# Patient Record
Sex: MALE | Race: BLACK OR AFRICAN AMERICAN | Employment: OTHER | URBAN - METROPOLITAN AREA
[De-identification: names, ages, dates, MRNs, and addresses within clinical notes are randomized per-mention and may not be internally consistent; named-entity substitution may affect disease eponyms.]

---

## 2017-07-02 DIAGNOSIS — N52.01 ERECTILE DYSFUNCTION DUE TO ARTERIAL INSUFFICIENCY: ICD-10-CM

## 2017-07-03 RX ORDER — SILDENAFIL CITRATE 100 MG/1
TABLET, FILM COATED ORAL
Qty: 10 TAB | Refills: 0 | OUTPATIENT
Start: 2017-07-03

## 2017-07-21 ENCOUNTER — HOSPITAL ENCOUNTER (OUTPATIENT)
Dept: LAB | Age: 72
Discharge: HOME | End: 2017-07-21

## 2017-07-21 PROCEDURE — 99001 SPECIMEN HANDLING PT-LAB: CPT | Performed by: FAMILY MEDICINE

## 2017-07-21 NOTE — TELEPHONE ENCOUNTER
Patient requesting the following medication refill     Medication requesting Viagra    Date last prescribed 06/27/2016  QTY 10 Refills 12    Date patient last seen 8/23/16    Follow up appt scheduled for 08/02/17    Please advise

## 2017-07-22 RX ORDER — SILDENAFIL 100 MG/1
100 TABLET, FILM COATED ORAL AS NEEDED
Qty: 10 TAB | Refills: 12 | Status: SHIPPED | OUTPATIENT
Start: 2017-07-22 | End: 2017-08-02 | Stop reason: SDUPTHER

## 2017-08-02 ENCOUNTER — OFFICE VISIT (OUTPATIENT)
Dept: FAMILY MEDICINE CLINIC | Age: 72
End: 2017-08-02

## 2017-08-02 VITALS
DIASTOLIC BLOOD PRESSURE: 90 MMHG | BODY MASS INDEX: 32.53 KG/M2 | HEIGHT: 72 IN | OXYGEN SATURATION: 95 % | HEART RATE: 92 BPM | RESPIRATION RATE: 18 BRPM | WEIGHT: 240.2 LBS | TEMPERATURE: 97.7 F | SYSTOLIC BLOOD PRESSURE: 137 MMHG

## 2017-08-02 DIAGNOSIS — Z00.00 ROUTINE GENERAL MEDICAL EXAMINATION AT A HEALTH CARE FACILITY: Primary | ICD-10-CM

## 2017-08-02 DIAGNOSIS — N52.01 ERECTILE DYSFUNCTION DUE TO ARTERIAL INSUFFICIENCY: ICD-10-CM

## 2017-08-02 DIAGNOSIS — D47.Z2 CASTLEMAN DISEASE (HCC): ICD-10-CM

## 2017-08-02 RX ORDER — SILDENAFIL 100 MG/1
100 TABLET, FILM COATED ORAL AS NEEDED
Qty: 10 TAB | Refills: 12 | Status: SHIPPED | OUTPATIENT
Start: 2017-08-02 | End: 2017-09-26 | Stop reason: SDUPTHER

## 2017-08-02 NOTE — MR AVS SNAPSHOT
Visit Information Date & Time Provider Department Dept. Phone Encounter #  
 8/2/2017  8:00 AM Bev Rich, [address] [facility] [phone] [id number] Follow-up Instructions Return in about 1 year (around 8/2/2018) for physical, labs at next visit, EKG next visit. Upcoming Health Maintenance Date Due DTaP/Tdap/Td series (2 - Td) 2/20/2017 MEDICARE YEARLY EXAM 6/29/2017 INFLUENZA AGE 9 TO ADULT 8/1/2017 GLAUCOMA SCREENING Q2Y 2/2/2018 COLONOSCOPY 12/20/2026 Allergies as of 8/2/2017  Review Complete On: 8/2/2017 By: Bev Rich, DO No Known Allergies Current Immunizations  Reviewed on 10/5/2015 Name Date Influenza High Dose Vaccine PF 10/5/2015 Influenza Vaccine 11/20/2012 Pneumococcal Conjugate (PCV-13) 10/5/2015 Pneumococcal Vaccine (Unspecified Type) 11/20/2012 Tdap 2/20/2007 Zoster Vaccine, Live 11/20/2012 Not reviewed this visit You Were Diagnosed With   
  
 Codes Comments Routine general medical examination at a health care facility    -  Primary ICD-10-CM: Z00.00 ICD-9-CM: V70.0 [facility]     ICD-10-CM: D47. Z2 
ICD-9-CM: 785.6 Erectile dysfunction due to arterial insufficiency     ICD-10-CM: N52.01 
ICD-9-CM: 607.84 Vitals BP Pulse Temp Resp Height(growth percentile) Weight(growth percentile) 137/90 (BP 1 Location: Right arm, BP Patient Position: Sitting) 92 97.7 °F (36.5 °C) (Oral) 18 6' (1.829 m) 240 lb 3.2 oz (109 kg) SpO2 BMI Smoking Status 95% 32.58 kg/m2 Never Smoker BMI and BSA Data Body Mass Index Body Surface Area 32.58 kg/m 2 2.35 m 2 Preferred Pharmacy Pharmacy Name Phone [facility] [phone] [facility] [address], [address] [phone] Your Updated Medication List  
  
   
 This list is accurate as of: 17  9:12 AM.  Always use your most recent med list.  
  
  
  
  
 diph,pertuss(acel),tetanus vac(PF) 2 Lf-(2.5-5-3-5 mcg)-5Lf/0.5 mL Syrg vaccine Commonly known as:  ADACEL  
0.5 mL by IntraMUSCular route once for 1 dose. Indications: DIPHTHERIA-PERTUSSIS-TETANUS COMBINED PREVENTION  
  
 FISH OIL Cap Generic drug:  omega-3 fatty acids Take 1 Cap by mouth daily. multivitamin tablet Commonly known as:  ONE A DAY Take 1 Tab by mouth daily. sildenafil citrate 100 mg tablet Commonly known as:  VIAGRA Take 1 Tab by mouth as needed. Prescriptions Printed Refills diph,pertuss,acel,,tetanus vac,PF, (ADACEL) 2 Lf-(2.5-5-3-5 mcg)-5Lf/0.5 mL syrg vaccine 0 Si.5 mL by IntraMUSCular route once for 1 dose. Indications: DIPHTHERIA-PERTUSSIS-TETANUS COMBINED PREVENTION Class: Print Route: IntraMUSCular Prescriptions Sent to Pharmacy Refills  
 sildenafil citrate (VIAGRA) 100 mg tablet 12 Sig: Take 1 Tab by mouth as needed. Class: Normal  
 Pharmacy: [facility] [address] [place] [address] Rd, [address] #: [phone] Route: Oral  
  
We Performed the Following AMB POC EKG ROUTINE  LEADS, INTER & REP [43519 CPT(R)] Follow-up Instructions Return in about 1 year (around 2018) for physical, labs at next visit, EKG next visit. Introducing [facility]! Dear Jhon Riggs: Thank you for requesting a [employer] account. Our records indicate that you already have an active [employer] account. You can access your account anytime at [email or web address] Did you know that you can access your hospital and ER discharge instructions at any time in [employer]? You can also review all of your test results from your hospital stay or ER visit. Additional Information If you have questions, please visit the Frequently Asked Questions section of the [email or web address] website at [email or web address] Remember, [employer] is NOT to be used for urgent needs. For medical emergencies, dial 911. Now available from your iPhone and Android! Please provide this summary of care documentation to your next provider. Your primary care clinician is listed as [id number] [place] [address]. If you have any questions after today's visit, please call [phone].

## 2017-08-02 NOTE — PROGRESS NOTES
Shahid Calixto is a 71 y.o. male presents today for his medicare annual exam.  He would also like to discuss removing ear wax. Pt is in Room # 6        Learning Assessment (baseline): Completed  Depression Screening: Completed  Fall Risk Screening: Completed  Abuse screening: Completed  ADL Assessment: Completed    1. Have you been to the ER, urgent care clinic since your last visit? Hospitalized since your last visit? No    2. Have you seen or consulted any other health care providers outside of the [address] [place] [address] since your last visit? Include any pap smears or colon screening.  No

## 2017-08-02 NOTE — PROGRESS NOTES
THE SUBSEQUENT MEDICARE ANNUAL WELLNESS VISIT PROGRESS NOTES    This is a Subsequent Medicare Annual Wellness Visit providing Personalized Prevention Plan Services (PPPS) (Performed 12 months after initial AWV and PPPS )    I have reviewed the patient's medical history in detail and updated the computerized patient record. Pat Parish is a 71 y.o.  male and presents for an subsequent annual wellness exam     Patient Active Problem List    Diagnosis Date Noted    Advance directive discussed with patient 06/28/2016    [facility] 01/26/2016    Erectile dysfunction 02/20/2013     Current Outpatient Prescriptions   Medication Sig Dispense Refill    diph,pertuss,acel,,tetanus vac,PF, (ADACEL) 2 Lf-(2.5-5-3-5 mcg)-5Lf/0.5 mL syrg vaccine 0.5 mL by IntraMUSCular route once for 1 dose. Indications: DIPHTHERIA-PERTUSSIS-TETANUS COMBINED PREVENTION 0.5 mL 0    sildenafil citrate (VIAGRA) 100 mg tablet Take 1 Tab by mouth as needed. 10 Tab 12    multivitamin (ONE A DAY) tablet Take 1 Tab by mouth daily.  omega-3 fatty acids (FISH OIL) cap Take 1 Cap by mouth daily. 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 Bird Wade     Family History   Problem Relation Age of Onset    Heart Disease Mother      Social History   Substance Use Topics    Smoking status: Never Smoker    Smokeless tobacco: Never Used    Alcohol use No         ROS       All other systems reviewed and are negative.       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 Sarah Alejo     Current Outpatient Prescriptions   Medication Sig Dispense Refill    diph,pertuss,acel,,tetanus vac,PF, (ADACEL) 2 Lf-(2.5-5-3-5 mcg)-5Lf/0.5 mL syrg vaccine 0.5 mL by IntraMUSCular route once for 1 dose. Indications: DIPHTHERIA-PERTUSSIS-TETANUS COMBINED PREVENTION 0.5 mL 0    sildenafil citrate (VIAGRA) 100 mg tablet Take 1 Tab by mouth as needed. 10 Tab 12    multivitamin (ONE A DAY) tablet Take 1 Tab by mouth daily.  omega-3 fatty acids (FISH OIL) cap Take 1 Cap by mouth daily. No Known Allergies  Family History   Problem Relation Age of Onset    Heart Disease Mother      Social History   Substance Use Topics    Smoking status: Never Smoker    Smokeless tobacco: Never Used    Alcohol use No     Patient Active Problem List   Diagnosis Code    Erectile dysfunction N52.9    [facility]man [facility] ([facility] D47. Z2    Advance directive discussed with patient Z70.80       Health Maintenance History  Immunizations reviewed, dtap ordered  , pneumovax utd  , flu , zoster utd   Colonoscopy: utd , 2012  Chest CT :na,  Eye exam: utd     Health Care Directive or Living Will: yes    Depression Risk Factor Screening:      Patient Health Questionnaire (PHQ-2)   Over the last 2 weeks, how often have you been bothered by any of the following problems? · Little interest or pleasure in doing things? · Not at all. [0]  · Feeling down, depressed, or hopeless? · Not at all. [0]    Total Score: 0/6  PHQ-2 Assessment Scoring:   A score of 2 or more requires further screening with the PHQ-9    Alcohol Risk Factor Screening:     Women: On any occasion during the past 3 months, have you had more than 3 drinks containing alcohol? Do you average more than 7 drinks per week?   Men: On any occasion during the past 3 months, have you had more than 4 drinks containing alcohol? Do you average more than 14 drinks per week? Functional Ability and Level of Safety:     Hearing Loss    mild    Activities of Daily Living   Self-care. Requires assistance with: no ADLs    Fall Risk   No fall risk factors    Abuse Screen   None      Examination   Physical Examination  Vitals:    08/02/17 0836   BP: 137/90   Pulse: 92   Resp: 18   Temp: 97.7 °F (36.5 °C)   TempSrc: Oral   SpO2: 95%   Weight: 240 lb 3.2 oz (109 kg)   Height: 6' (1.829 m)   PainSc:   0 - No pain     Body mass index is 32.58 kg/(m^2). Evaluation of Cognitive Function:  Mood/affect:ajppropriate   Appearance: well groomed   Family member/caregiver input:n    alert, well appearing, and in no distress, oriented to person, place, and time and normal appearing weight    Patient Care Team:  Camilla Lundborg., DO as PCP - General (Family Practice)  Jannifer Heimlich, MD (Urology)    Advice/Referrals/Counseling/Plan:   Education and counseling provided:  Are appropriate based on today's review and evaluation  End-of-Life planning (with patient's consent)  Include in education list (weight loss, physical activity, smoking cessation, fall prevention, and nutrition)  current treatment plan is effective, no change in therapy. I have discussed the diagnosis with the patient and the intended plan as seen in the above orders. The patient has received an after-visit summary and questions were answered concerning future plans. I have discussed medication side effects and warnings with the patient as well. I have reviewed the plan of care with the patient, accepted their input and they are in agreement with the treatment goals.          Follow-up Disposition:  Return in about 1 year (around 8/2/2018) for physical, labs at next visit, EKG next visit.    _____________________________________________________________    Problem Assessment    for treatment of   Chief Complaint   Patient presents with   [address] [place] [address] Other castlemans dz         SUBJECTIVE          Additional Concerns:        Visit Vitals    /90 (BP 1 Location: Right arm, BP Patient Position: Sitting)    Pulse 92    Temp 97.7 °F (36.5 °C) (Oral)    Resp 18    Ht 6' (1.829 m)    Wt 240 lb 3.2 oz (109 kg)    SpO2 95%    BMI 32.58 kg/m2     General:  Alert, cooperative, no distress, appears stated age. Head:  Normocephalic, without obvious abnormality, atraumatic. Eyes:  Conjunctivae/corneas clear. PERRL, EOMs intact. Fundi benign   Ears:  Normal TMs and external ear canals both ears. Nose: Nares normal. Septum midline. Mucosa normal. No drainage or sinus tenderness. Throat: Lips, mucosa, and tongue normal. Teeth and gums normal.   Neck: Supple, symmetrical, trachea midline, no adenopathy, thyroid: no enlargement/tenderness/nodules, no carotid bruit and no JVD. Back:   Symmetric, no curvature. ROM normal. No CVA tenderness. Lungs:   Clear to auscultation bilaterally. Chest wall:  No tenderness or deformity. Heart:  Regular rate and rhythm, S1, S2 normal, no murmur, click, rub or gallop. Abdomen:   Soft, non-tender. Bowel sounds normal. No masses,  No organomegaly. Genitalia:  Normal male without lesion, discharge or tenderness. Rectal:  Normal tone, normal prostate, no masses or tenderness  Guaiac negative stool. Extremities: Extremities normal, atraumatic, no cyanosis or edema. Pulses: 2+ and symmetric all extremities. Skin: Skin color, texture, turgor normal. No rashes or lesions   Lymph nodes: Cervical, supraclavicular, and axillary nodes normal.   Neurologic: CNII-XII intact. Normal strength, sensation and reflexes throughout.              LABS   Component      Latest Ref Rng & Units 7/21/2017 7/21/2017 7/21/2017 7/21/2017           8:51 AM  8:51 AM  8:51 AM  8:51 AM   WBC      3.4 - 10.8 x10E3/uL       RBC      4.14 - 5.80 x10E6/uL       HGB      12.6 - 17.7 g/dL       HCT      37.5 - 51.0 %       MCV      79 - 97 fL MCH      26.6 - 33.0 pg       MCHC      31.5 - 35.7 g/dL       RDW      12.3 - 15.4 %       PLATELET      465 - 826 x10E3/uL       NEUTROPHILS      %       Lymphocytes      %       MONOCYTES      %       EOSINOPHILS      %       BASOPHILS      %       ABS. NEUTROPHILS      1.4 - 7.0 x10E3/uL       Abs Lymphocytes      0.7 - 3.1 x10E3/uL       ABS. MONOCYTES      0.1 - 0.9 x10E3/uL       ABS. EOSINOPHILS      0.0 - 0.4 x10E3/uL       ABS. BASOPHILS      0.0 - 0.2 x10E3/uL       IMMATURE GRANULOCYTES      %       ABS. IMM. GRANS.      0.0 - 0.1 x10E3/uL       Glucose      65 - 99 mg/dL       BUN      8 - 27 mg/dL       Creatinine      0.76 - 1.27 mg/dL       GFR est non-AA      >59 mL/min/1.73       GFR est AA      >59 mL/min/1.73       BUN/Creatinine ratio      10 - 24       Sodium      134 - 144 mmol/L       Potassium      3.5 - 5.2 mmol/L       Chloride      96 - 106 mmol/L       CO2      18 - 29 mmol/L       Calcium      8.6 - 10.2 mg/dL       Protein, total      6.0 - 8.5 g/dL       Albumin      3.5 - 4.8 g/dL       GLOBULIN, TOTAL      1.5 - 4.5 g/dL       A-G Ratio      1.2 - 2.2       Bilirubin, total      0.0 - 1.2 mg/dL       Alk.  phosphatase      39 - 117 IU/L       AST      0 - 40 IU/L       ALT (SGPT)      0 - 44 IU/L       Specific Gravity      1.005 - 1.030    1.021   pH (UA)      5.0 - 7.5    6.0   Color      Yellow    Yellow   Appearance      Clear    Clear   Leukocyte Esterase      Negative    Negative   Protein      Negative/Trace    Negative   Glucose      Negative    Negative   Ketone      Negative    Negative   Blood      Negative    Negative   Bilirubin      Negative    Negative   Urobilinogen      0.2 - 1.0 mg/dL    0.2   Nitrites      Negative    Negative   Microscopic Examination          Comment   Cholesterol, total      100 - 199 mg/dL   198    Triglyceride      0 - 149 mg/dL   83    HDL Cholesterol      >39 mg/dL   52    VLDL, calculated      5 - 40 mg/dL   17    LDL, calculated      0 - 99 mg/dL   129 (H)    Prostate Specific Ag      0.0 - 4.0 ng/mL  6.0 (H)     TSH      0.450 - 4.500 uIU/mL 4.460        Component      Latest Ref Rng & Units 7/21/2017 7/21/2017           8:51 AM  8:51 AM   WBC      3.4 - 10.8 x10E3/uL  6.1   RBC      4.14 - 5.80 x10E6/uL  4.47   HGB      12.6 - 17.7 g/dL  12.8   HCT      37.5 - 51.0 %  40.4   MCV      79 - 97 fL  90   MCH      26.6 - 33.0 pg  28.6   MCHC      31.5 - 35.7 g/dL  31.7   RDW      12.3 - 15.4 %  13.4   PLATELET      886 - 616 x10E3/uL  290   NEUTROPHILS      %  51   Lymphocytes      %  39   MONOCYTES      %  8   EOSINOPHILS      %  2   BASOPHILS      %  0   ABS. NEUTROPHILS      1.4 - 7.0 x10E3/uL  3.1   Abs Lymphocytes      0.7 - 3.1 x10E3/uL  2.3   ABS. MONOCYTES      0.1 - 0.9 x10E3/uL  0.5   ABS. EOSINOPHILS      0.0 - 0.4 x10E3/uL  0.1   ABS. BASOPHILS      0.0 - 0.2 x10E3/uL  0.0   IMMATURE GRANULOCYTES      %  0   ABS. IMM. GRANS.      0.0 - 0.1 x10E3/uL  0.0   Glucose      65 - 99 mg/dL 102 (H)    BUN      8 - 27 mg/dL 16    Creatinine      0.76 - 1.27 mg/dL 1.04    GFR est non-AA      >59 mL/min/1.73 72    GFR est AA      >59 mL/min/1.73 83    BUN/Creatinine ratio      10 - 24 15    Sodium      134 - 144 mmol/L 141    Potassium      3.5 - 5.2 mmol/L 4.4    Chloride      96 - 106 mmol/L 100    CO2      18 - 29 mmol/L 25    Calcium      8.6 - 10.2 mg/dL 9.3    Protein, total      6.0 - 8.5 g/dL 7.8    Albumin      3.5 - 4.8 g/dL 4.3    GLOBULIN, TOTAL      1.5 - 4.5 g/dL 3.5    A-G Ratio      1.2 - 2.2 1.2    Bilirubin, total      0.0 - 1.2 mg/dL 0.4    Alk.  phosphatase      39 - 117 IU/L 52    AST      0 - 40 IU/L 13    ALT (SGPT)      0 - 44 IU/L 12    Specific Gravity      1.005 - 1.030     pH (UA)      5.0 - 7.5     Color      Yellow     Appearance      Clear     Leukocyte Esterase      Negative     Protein      Negative/Trace     Glucose      Negative     Ketone      Negative     Blood      Negative     Bilirubin      Negative Urobilinogen      0.2 - 1.0 mg/dL     Nitrites      Negative     Microscopic Examination           Cholesterol, total      100 - 199 mg/dL     Triglyceride      0 - 149 mg/dL     HDL Cholesterol      >39 mg/dL     VLDL, calculated      5 - 40 mg/dL     LDL, calculated      0 - 99 mg/dL     Prostate Specific Ag      0.0 - 4.0 ng/mL     TSH      0.450 - 4.500 uIU/mL       TESTS    ekg  nsr      Assessment/Plan:        Diagnoses and all orders for this visit:    1. Routine general medical examination at a health care facility  -     AMB POC EKG ROUTINE W/ 12 LEADS, INTER & REP  -     diph,pertuss,acel,,tetanus vac,PF, (ADACEL) 2 Lf-(2.5-5-3-5 mcg)-5Lf/0.5 mL syrg vaccine; 0.5 mL by IntraMUSCular route once for 1 dose. Indications: DIPHTHERIA-PERTUSSIS-TETANUS COMBINED PREVENTION    2. 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 Key Psychotherapeutic Meds     Patient is on no antidepressant meds. Lab Results   Component Value Date/Time    Sodium 141 07/21/2017 08:51 AM    Creatinine 1.04 07/21/2017 08:51 AM    TSH 4.460 07/21/2017 08:51 AM    WBC 6.1 07/21/2017 08:51 AM    ALT (SGPT) 12 07/21/2017 08:51 AM    AST (SGOT) 13 07/21/2017 08:51 AM       Orders:  -     AMB POC EKG ROUTINE W/ 12 LEADS, INTER & REP  -     diph,pertuss,acel,,tetanus vac,PF, (ADACEL) 2 Lf-(2.5-5-3-5 mcg)-5Lf/0.5 mL syrg vaccine; 0.5 mL by IntraMUSCular route once for 1 dose. Indications: DIPHTHERIA-PERTUSSIS-TETANUS COMBINED PREVENTION    3. Erectile dysfunction due to arterial insufficiency  -     AMB POC EKG ROUTINE W/ 12 LEADS, INTER & REP  -     diph,pertuss,acel,,tetanus vac,PF, (ADACEL) 2 Lf-(2.5-5-3-5 mcg)-5Lf/0.5 mL syrg vaccine; 0.5 mL by IntraMUSCular route once for 1 dose.  Indications: DIPHTHERIA-PERTUSSIS-TETANUS COMBINED PREVENTION  -     sildenafil citrate (VIAGRA) 100 mg tablet; Take 1 Tab by mouth as needed.         Lab review: labs are reviewed, up to date and normal

## 2017-08-02 NOTE — ASSESSMENT & PLAN NOTE
Stable, based on history, physical exam and review of pertinent labs, studies and medications; meds reconciled; continue current treatment plan. Key Psychotherapeutic Meds     Patient is on no antidepressant meds.         Lab Results   Component Value Date/Time    Sodium 141 07/21/2017 08:51 AM    Creatinine 1.04 07/21/2017 08:51 AM    TSH 4.460 07/21/2017 08:51 AM    WBC 6.1 07/21/2017 08:51 AM    ALT (SGPT) 12 07/21/2017 08:51 AM    AST (SGOT) 13 07/21/2017 08:51 AM

## 2017-08-23 ENCOUNTER — HOSPITAL ENCOUNTER (EMERGENCY)
Age: 72
Discharge: HOME | End: 2017-08-23
Attending: EMERGENCY MEDICINE
Payer: MEDICARE

## 2017-08-23 ENCOUNTER — APPOINTMENT (OUTPATIENT)
Dept: GENERAL RADIOLOGY | Age: 72
End: 2017-08-23
Attending: EMERGENCY MEDICINE
Payer: MEDICARE

## 2017-08-23 VITALS
TEMPERATURE: 98.4 F | BODY MASS INDEX: 31.19 KG/M2 | HEART RATE: 90 BPM | WEIGHT: 230 LBS | OXYGEN SATURATION: 98 % | DIASTOLIC BLOOD PRESSURE: 92 MMHG | RESPIRATION RATE: 17 BRPM | SYSTOLIC BLOOD PRESSURE: 168 MMHG

## 2017-08-23 DIAGNOSIS — R03.0 ELEVATED BLOOD PRESSURE READING: ICD-10-CM

## 2017-08-23 DIAGNOSIS — M54.50 ACUTE RIGHT-SIDED LOW BACK PAIN WITHOUT SCIATICA: ICD-10-CM
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
## 2017-08-23 DIAGNOSIS — M25.522 LEFT ELBOW PAIN: ICD-10-CM

## 2017-08-23 DIAGNOSIS — S16.1XXA CERVICAL STRAIN, INITIAL ENCOUNTER: ICD-10-CM

## 2017-08-23 PROCEDURE — 99282 EMERGENCY DEPT VISIT SF MDM: CPT

## 2017-08-23 RX ORDER — METAXALONE 800 MG/1
800 TABLET ORAL 4 TIMES DAILY
Qty: 20 TAB | Refills: 0 | Status: SHIPPED | OUTPATIENT
Start: 2017-08-23 | End: 2017-08-28

## 2017-08-23 RX ORDER — HYDROCODONE BITARTRATE AND ACETAMINOPHEN 5; 325 MG/1; MG/1
TABLET ORAL
Qty: 12 TAB | Refills: 0 | Status: SHIPPED | OUTPATIENT
Start: 2017-08-23 | End: 2017-09-05
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
## 2017-08-23 NOTE — ED NOTES
I have reviewed discharge instructions with the patient. The patient verbalized understanding. Pt in nad ambulatory to ED lobby, agreeable to dc plan.

## 2017-09-05 ENCOUNTER — OFFICE VISIT (OUTPATIENT)
Dept: FAMILY MEDICINE CLINIC | Age: 72
End: 2017-09-05

## 2017-09-05 VITALS
RESPIRATION RATE: 18 BRPM | WEIGHT: 238.2 LBS | HEART RATE: 90 BPM | OXYGEN SATURATION: 96 % | SYSTOLIC BLOOD PRESSURE: 139 MMHG | BODY MASS INDEX: 32.26 KG/M2 | DIASTOLIC BLOOD PRESSURE: 79 MMHG | HEIGHT: 72 IN | TEMPERATURE: 96.8 F

## 2017-09-05 DIAGNOSIS — D47.Z2 CASTLEMAN DISEASE (HCC): Primary | ICD-10-CM

## 2017-09-05 DIAGNOSIS — Z23 ENCOUNTER FOR IMMUNIZATION: ICD-10-CM
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
## 2017-09-18 DIAGNOSIS — N52.01 ERECTILE DYSFUNCTION DUE TO ARTERIAL INSUFFICIENCY: ICD-10-CM

## 2017-09-18 RX ORDER — SILDENAFIL 100 MG/1
100 TABLET, FILM COATED ORAL AS NEEDED
Qty: 10 TAB | Refills: 12 | OUTPATIENT
Start: 2017-09-18

## 2017-09-21 ENCOUNTER — PATIENT MESSAGE (OUTPATIENT)
Dept: FAMILY MEDICINE CLINIC | Age: 72
End: 2017-09-21

## 2017-09-26 DIAGNOSIS — N52.01 ERECTILE DYSFUNCTION DUE TO ARTERIAL INSUFFICIENCY: ICD-10-CM

## 2017-09-26 RX ORDER — SILDENAFIL 100 MG/1
100 TABLET, FILM COATED ORAL AS NEEDED
Qty: 10 TAB | Refills: 12 | Status: SHIPPED | OUTPATIENT
Start: 2017-09-26 | End: 2018-08-13 | Stop reason: SDUPTHER

## 2017-09-26 NOTE — TELEPHONE ENCOUNTER
----- Message from [facility] sent at 9/22/2017  8:12 AM EDT -----  Regarding: FW: Prescription Question  Contact: [phone]   Please see below and advise.  ----- Message -----     From: Trishane Elida     Sent: 9/21/2017   9:22 PM       To: [facility] Nurse Pool  Subject: Prescription Question                            Dr. Bernardino Link, I have found a pharmacy that sells Phisher Viagra for $10.00 a pill(100 mg) instead of the $70.00 a pill that [facility] charges. I need a prescription written by you and signed, they will not accept a copy from [facility]. Could you send me a copy on my e mail, I don't have a fax ? E mail [email or web address] .     Thank [address] [phone]

## 2018-08-06 ENCOUNTER — TELEPHONE (OUTPATIENT)
Dept: FAMILY MEDICINE CLINIC | Age: 73
End: 2018-08-06

## 2018-08-06 ENCOUNTER — HOSPITAL ENCOUNTER (OUTPATIENT)
Dept: LAB | Age: 73
Discharge: HOME | End: 2018-08-06
Payer: MEDICARE

## 2018-08-06 DIAGNOSIS — D47.Z2 CASTLEMAN DISEASE (HCC): Primary | ICD-10-CM

## 2018-08-06 DIAGNOSIS — D47.Z2 CASTLEMAN DISEASE (HCC): ICD-10-CM

## 2018-08-06 DIAGNOSIS — N52.9 ERECTILE DYSFUNCTION, UNSPECIFIED ERECTILE DYSFUNCTION TYPE: ICD-10-CM

## 2018-08-06 LAB
ALBUMIN SERPL-MCNC: 3.7 G/DL (ref 3.4–5)
ALBUMIN/GLOB SERPL: 0.9 {RATIO} (ref 0.8–1.7)
ALP SERPL-CCNC: 53 U/L (ref 45–117)
ALT SERPL-CCNC: 20 U/L (ref 16–61)
ANION GAP SERPL CALC-SCNC: 6 MMOL/L (ref 3–18)
APPEARANCE UR: CLEAR
AST SERPL-CCNC: 17 U/L (ref 15–37)
BACTERIA URNS QL MICRO: NEGATIVE /HPF
BASOPHILS # BLD: 0 K/UL (ref 0–0.1)
BASOPHILS NFR BLD: 1 % (ref 0–2)
BILIRUB SERPL-MCNC: 0.6 MG/DL (ref 0.2–1)
BILIRUB UR QL: NEGATIVE
BUN SERPL-MCNC: 18 MG/DL (ref 7–18)
BUN/CREAT SERPL: 16 (ref 12–20)
CALCIUM SERPL-MCNC: 8.5 MG/DL (ref 8.5–10.1)
CHLORIDE SERPL-SCNC: 111 MMOL/L (ref 100–108)
CHOLEST SERPL-MCNC: 189 MG/DL
CO2 SERPL-SCNC: 26 MMOL/L (ref 21–32)
COLOR UR: YELLOW
CREAT SERPL-MCNC: 1.14 MG/DL (ref 0.6–1.3)
DIFFERENTIAL METHOD BLD: ABNORMAL
EOSINOPHIL # BLD: 0.1 K/UL (ref 0–0.4)
EOSINOPHIL NFR BLD: 2 % (ref 0–5)
EPITH CASTS URNS QL MICRO: NORMAL /LPF (ref 0–5)
ERYTHROCYTE [DISTWIDTH] IN BLOOD BY AUTOMATED COUNT: 12.8 % (ref 11.6–14.5)
GLOBULIN SER CALC-MCNC: 4.1 G/DL (ref 2–4)
GLUCOSE SERPL-MCNC: 96 MG/DL (ref 74–99)
GLUCOSE UR STRIP.AUTO-MCNC: NEGATIVE MG/DL
HCT VFR BLD AUTO: 37.8 % (ref 36–48)
HDLC SERPL-MCNC: 54 MG/DL (ref 40–60)
HDLC SERPL: 3.5 {RATIO} (ref 0–5)
HGB BLD-MCNC: 12.5 G/DL (ref 13–16)
HGB UR QL STRIP: NEGATIVE
KETONES UR QL STRIP.AUTO: NEGATIVE MG/DL
LDLC SERPL CALC-MCNC: 123.6 MG/DL (ref 0–100)
LEUKOCYTE ESTERASE UR QL STRIP.AUTO: NEGATIVE
LIPID PROFILE,FLP: ABNORMAL
LYMPHOCYTES # BLD: 1.9 K/UL (ref 0.9–3.6)
LYMPHOCYTES NFR BLD: 36 % (ref 21–52)
MCH RBC QN AUTO: 29.3 PG (ref 24–34)
MCHC RBC AUTO-ENTMCNC: 33.1 G/DL (ref 31–37)
MCV RBC AUTO: 88.7 FL (ref 74–97)
MONOCYTES # BLD: 0.5 K/UL (ref 0.05–1.2)
MONOCYTES NFR BLD: 9 % (ref 3–10)
NEUTS SEG # BLD: 2.8 K/UL (ref 1.8–8)
NEUTS SEG NFR BLD: 52 % (ref 40–73)
NITRITE UR QL STRIP.AUTO: NEGATIVE
PH UR STRIP: 5 [PH] (ref 5–8)
PLATELET # BLD AUTO: 277 K/UL (ref 135–420)
PMV BLD AUTO: 9.6 FL (ref 9.2–11.8)
POTASSIUM SERPL-SCNC: 4.1 MMOL/L (ref 3.5–5.5)
PROT SERPL-MCNC: 7.8 G/DL (ref 6.4–8.2)
PROT UR STRIP-MCNC: ABNORMAL MG/DL
PSA SERPL-MCNC: 6.9 NG/ML (ref 0–4)
RBC # BLD AUTO: 4.26 M/UL (ref 4.7–5.5)
RBC #/AREA URNS HPF: 0 /HPF (ref 0–5)
SODIUM SERPL-SCNC: 143 MMOL/L (ref 136–145)
SP GR UR REFRACTOMETRY: >1.03 (ref 1–1.03)
TRIGL SERPL-MCNC: 57 MG/DL (ref ?–150)
TSH SERPL DL<=0.05 MIU/L-ACNC: 2.6 UIU/ML (ref 0.36–3.74)
UROBILINOGEN UR QL STRIP.AUTO: 0.2 EU/DL (ref 0.2–1)
VLDLC SERPL CALC-MCNC: 11.4 MG/DL
WBC # BLD AUTO: 5.3 K/UL (ref 4.6–13.2)
WBC URNS QL MICRO: NORMAL /HPF (ref 0–4)

## 2018-08-06 PROCEDURE — 81001 URINALYSIS AUTO W/SCOPE: CPT | Performed by: FAMILY MEDICINE

## 2018-08-06 PROCEDURE — 84443 ASSAY THYROID STIM HORMONE: CPT | Performed by: FAMILY MEDICINE

## 2018-08-06 PROCEDURE — 36415 COLL VENOUS BLD VENIPUNCTURE: CPT | Performed by: FAMILY MEDICINE

## 2018-08-06 PROCEDURE — 80061 LIPID PANEL: CPT | Performed by: FAMILY MEDICINE

## 2018-08-06 PROCEDURE — 84153 ASSAY OF PSA TOTAL: CPT | Performed by: FAMILY MEDICINE

## 2018-08-06 PROCEDURE — 80053 COMPREHEN METABOLIC PANEL: CPT | Performed by: FAMILY MEDICINE

## 2018-08-06 PROCEDURE — 85025 COMPLETE CBC W/AUTO DIFF WBC: CPT | Performed by: FAMILY MEDICINE

## 2018-08-06 NOTE — TELEPHONE ENCOUNTER
Patient called from [facility] to ask for physical labs as he is scheduled for his MWE next week. Labs ordered.

## 2018-08-13 ENCOUNTER — OFFICE VISIT (OUTPATIENT)
Dept: FAMILY MEDICINE CLINIC | Age: 73
End: 2018-08-13

## 2018-08-13 VITALS
OXYGEN SATURATION: 95 % | WEIGHT: 243.6 LBS | TEMPERATURE: 97.8 F | HEIGHT: 72 IN | RESPIRATION RATE: 16 BRPM | DIASTOLIC BLOOD PRESSURE: 79 MMHG | BODY MASS INDEX: 33 KG/M2 | SYSTOLIC BLOOD PRESSURE: 132 MMHG | HEART RATE: 107 BPM

## 2018-08-13 DIAGNOSIS — D47.Z2 CASTLEMAN DISEASE (HCC): ICD-10-CM

## 2018-08-13 DIAGNOSIS — Z00.00 ROUTINE GENERAL MEDICAL EXAMINATION AT A HEALTH CARE FACILITY: Primary | ICD-10-CM

## 2018-08-13 DIAGNOSIS — N52.01 ERECTILE DYSFUNCTION DUE TO ARTERIAL INSUFFICIENCY: ICD-10-CM

## 2018-08-13 DIAGNOSIS — E78.00 ELEVATED CHOLESTEROL: ICD-10-CM

## 2018-08-13 RX ORDER — SILDENAFIL 100 MG/1
100 TABLET, FILM COATED ORAL AS NEEDED
Qty: 10 TAB | Refills: 12 | Status: SHIPPED | OUTPATIENT
Start: 2018-08-13 | End: 2019-07-30

## 2018-08-13 RX ORDER — SILDENAFIL 100 MG/1
100 TABLET, FILM COATED ORAL AS NEEDED
Qty: 10 TAB | Refills: 12 | Status: SHIPPED | OUTPATIENT
Start: 2018-08-13 | End: 2018-08-13 | Stop reason: SDUPTHER

## 2018-08-13 NOTE — PROGRESS NOTES
THE SUBSEQUENT MEDICARE ANNUAL WELLNESS VISIT PROGRESS NOTES    This is a Subsequent Medicare Annual Wellness Visit providing Personalized Prevention Plan Services (PPPS) (Performed 12 months after initial AWV and PPPS )    I have reviewed the patient's medical history in detail and updated the computerized patient record. Jennifer Ramirez is a 68 y.o.  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 10 Tab 12    multivitamin (ONE A DAY) tablet Take 1 Tab by mouth daily.  omega-3 fatty acids (FISH OIL) cap Take 1 Cap by mouth daily. No Known Allergies  Past Medical History:   Diagnosis Date    [facility] ([facility] 1/26/2016     Past Surgical History:   Procedure Laterality Date    ENDOSCOPY, COLON, DIAGNOSTIC      HX APPENDECTOMY  1/14/2016    HX CYST REMOVAL      on right hip and left testes    HX UROLOGICAL      removal of testicular cyst    LAP,APPENDECTOMY  01/14/2016    Dr. Prabhjot Benavidez     Family History   Problem Relation Age of Onset    Heart Disease Mother      Social History   Substance Use Topics    Smoking status: Never Smoker    Smokeless tobacco: Never Used    Alcohol use No         ROS       All other systems reviewed and are negative.       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 Vickey Graf Outpatient Prescriptions   Medication Sig Dispense Refill    sildenafil citrate (VIAGRA) 100 mg tablet Take 1 Tab by mouth as needed. 10 Tab 12    multivitamin (ONE A DAY) tablet Take 1 Tab by mouth daily.  omega-3 fatty acids (FISH OIL) cap Take 1 Cap by mouth daily. 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 · Little interest or pleasure in doing things? · Not at all. [0]  · Feeling down, depressed, or hopeless? · Not at all. [0]    Total Score: 0/6  PHQ-2 Assessment Scoring:   A score of 2 or more requires further screening with the PHQ-9    Alcohol Risk Factor Screening:     Women: On any occasion during the past 3 months, have you had more than 3 drinks containing alcohol? Do you average more than 7 drinks per week? Men: On any occasion during the past 3 months, have you had more than 4 drinks containing alcohol? Do you average more than 14 drinks per week? Functional Ability and Level of Safety:     Hearing Loss    Hearing is good. Activities of Daily Living   Self-care.    Requires assistance with: no ADLs    Fall Risk   No fall risk factors    Abuse Screen   None      Examination   Physical Examination  Vitals:    08/13/18 1313   BP: 132/79   Pulse: (!) 107   Resp: 16   Temp: 97.8 °F (36.6 °C)   TempSrc: Oral   SpO2: 95%   Weight: 243 lb 9.6 oz (110.5 kg)   Height: 6' (1.829 m)   PainSc:   0 - No pain     Body mass index is 33.04 kg/(m^2). Evaluation of Cognitive Function:  Mood/affect:appropriate   Appearance: well groomed   Family member/caregiver input: na     alert, well appearing, and in no distress, oriented to person, place, and time and normal appearing weight    Patient Care Team:  Mishel Aguilar, DO as PCP - General (Family Practice)  Maia Sever, MD (Urology)    Advice/Referrals/Counseling/Plan:   Education and counseling provided:  Are appropriate based on today's review and evaluation  Include in education list (weight loss, physical activity, smoking cessation, fall prevention, and nutrition)  current treatment plan is effective, no change in therapy. I have discussed the diagnosis with the patient and the intended plan as seen in the above orders. The patient has received an after-visit summary and questions were answered concerning future plans. I have discussed medication side effects and warnings with the patient as well. I have reviewed the plan of care with the patient, accepted their input and they are in agreement with the treatment goals. Follow-up Disposition:  Return in about 1 year (around 8/13/2019) for MWE, physical, labs prior. _____________________________________________________________    Problem Assessment    for treatment of   Chief Complaint   Patient presents with    Other     castlemans dz         SUBJECTIVE          Additional Concerns: castlemans dz       Visit Vitals    /79 (BP 1 Location: Right arm, BP Patient Position: Sitting)    Pulse (!) 107    Temp 97.8 °F (36.6 °C) (Oral)    Resp 16    Ht 6' (1.829 m)    Wt 243 lb 9.6 oz (110.5 kg)    SpO2 95%    BMI 33.04 kg/m2     General:  Alert, cooperative, no distress, appears stated age.    Head:  Normocephalic, without obvious abnormality, atraumatic. Eyes:  Conjunctivae/corneas clear. PERRL, EOMs intact. Fundi benign   Ears:  Normal TMs and external ear canals both ears. Nose: Nares normal. Septum midline. Mucosa normal. No drainage or sinus tenderness. Throat: Lips, mucosa, and tongue normal. Teeth and gums normal.   Neck: Supple, symmetrical, trachea midline, no adenopathy, thyroid: no enlargement/tenderness/nodules, no carotid bruit and no JVD. Back:   Symmetric, no curvature. ROM normal. No CVA tenderness. Lungs:   Clear to auscultation bilaterally. Chest wall:  No tenderness or deformity. Heart:  Regular rate and rhythm, S1, S2 normal, no murmur, click, rub or gallop. Abdomen:   Soft, non-tender. Bowel sounds normal. No masses,  No organomegaly. Genitalia:  Normal male without lesion, discharge or tenderness. Rectal:  Normal tone, normal prostate, no masses or tenderness  Guaiac negative stool. Extremities: Extremities normal, atraumatic, no cyanosis or edema. Pulses: 2+ and symmetric all extremities. Skin: Skin color, texture, turgor normal. No rashes or lesions   Lymph nodes: Cervical, supraclavicular, and axillary nodes normal.   Neurologic: CNII-XII intact. Normal strength, sensation and reflexes throughout. LABS   Component      Latest Ref Rng & Units 8/6/2018 8/6/2018 8/6/2018 8/6/2018           8:43 AM  8:43 AM  8:43 AM  8:43 AM   WBC      4.6 - 13.2 K/uL       RBC      4.70 - 5.50 M/uL       HGB      13.0 - 16.0 g/dL       HCT      36.0 - 48.0 %       MCV      74.0 - 97.0 FL       MCH      24.0 - 34.0 PG       MCHC      31.0 - 37.0 g/dL       RDW      11.6 - 14.5 %       PLATELET      718 - 653 K/uL       MPV      9.2 - 11.8 FL       NEUTROPHILS      40 - 73 %       LYMPHOCYTES      21 - 52 %       MONOCYTES      3 - 10 %       EOSINOPHILS      0 - 5 %       BASOPHILS      0 - 2 %       ABS. NEUTROPHILS      1.8 - 8.0 K/UL       ABS. LYMPHOCYTES      0.9 - 3.6 K/UL       ABS. MONOCYTES      0.05 - 1.2 K/UL       ABS. EOSINOPHILS      0.0 - 0.4 K/UL       ABS. BASOPHILS      0.0 - 0.1 K/UL       DF             Sodium      136 - 145 mmol/L   143    Potassium      3.5 - 5.5 mmol/L   4.1    Chloride      100 - 108 mmol/L   111 (H)    CO2      21 - 32 mmol/L   26    Anion gap      3.0 - 18 mmol/L   6    Glucose      74 - 99 mg/dL   96    BUN      7.0 - 18 MG/DL   18    Creatinine      0.6 - 1.3 MG/DL   1.14    BUN/Creatinine ratio      12 - 20     16    GFR est AA      >60 ml/min/1.73m2   >60    GFR est non-AA      >60 ml/min/1.73m2   >60    Calcium      8.5 - 10.1 MG/DL   8.5    Bilirubin, total      0.2 - 1.0 MG/DL   0.6    ALT (SGPT)      16 - 61 U/L   20    AST      15 - 37 U/L   17    Alk.  phosphatase      45 - 117 U/L   53    Protein, total      6.4 - 8.2 g/dL   7.8    Albumin      3.4 - 5.0 g/dL   3.7    Globulin      2.0 - 4.0 g/dL   4.1 (H)    A-G Ratio      0.8 - 1.7     0.9    Color        YELLOW     Appearance        CLEAR     Specific gravity      1.005 - 1.030  >1.030 (H)     pH (UA)      5.0 - 8.0    5.0     Protein      NEG mg/dL  TRACE (A)     Glucose      NEG mg/dL  NEGATIVE     Ketone      NEG mg/dL  NEGATIVE     Bilirubin      NEG    NEGATIVE     Blood      NEG    NEGATIVE     Urobilinogen      0.2 - 1.0 EU/dL  0.2     Nitrites      NEG    NEGATIVE     Leukocyte Esterase      NEG    NEGATIVE     Cholesterol, total      <200 MG/DL    189   Triglyceride      <150 MG/DL    57   HDL Cholesterol      40 - 60 MG/DL    54   LDL, calculated      0 - 100 MG/DL    123.6 (H)   VLDL, calculated      MG/DL    11.4   CHOL/HDL Ratio      0 - 5.0      3.5   WBC      0 - 4 /hpf 0 to 2      RBC      0 - 5 /hpf 0      Epithelial cells      0 - 5 /lpf FEW      Bacteria      NEG /hpf NEGATIVE      Prostate Specific Ag      0.0 - 4.0 ng/mL       Prostate Specific Ag      0.0 - 4.0 ng/mL       TSH      0.36 - 3.74 uIU/mL         Component      Latest Ref Rng & Units 8/6/2018 8/6/2018 8/6/2018 7/21/2017           8:43 AM  8:43 AM  8:43 AM  8:51 AM   WBC      4.6 - 13.2 K/uL   5.3    RBC      4.70 - 5.50 M/uL   4.26 (L)    HGB      13.0 - 16.0 g/dL   12.5 (L)    HCT      36.0 - 48.0 %   37.8    MCV      74.0 - 97.0 FL   88.7    MCH      24.0 - 34.0 PG   29.3    MCHC      31.0 - 37.0 g/dL   33.1    RDW      11.6 - 14.5 %   12.8    PLATELET      193 - 746 K/uL   277    MPV      9.2 - 11.8 FL   9.6    NEUTROPHILS      40 - 73 %   52    LYMPHOCYTES      21 - 52 %   36    MONOCYTES      3 - 10 %   9    EOSINOPHILS      0 - 5 %   2    BASOPHILS      0 - 2 %   1    ABS. NEUTROPHILS      1.8 - 8.0 K/UL   2.8    ABS. LYMPHOCYTES      0.9 - 3.6 K/UL   1.9    ABS. MONOCYTES      0.05 - 1.2 K/UL   0.5    ABS. EOSINOPHILS      0.0 - 0.4 K/UL   0.1    ABS. BASOPHILS      0.0 - 0.1 K/UL   0.0    DF         AUTOMATED    Sodium      136 - 145 mmol/L       Potassium      3.5 - 5.5 mmol/L       Chloride      100 - 108 mmol/L       CO2      21 - 32 mmol/L       Anion gap      3.0 - 18 mmol/L       Glucose      74 - 99 mg/dL       BUN      7.0 - 18 MG/DL       Creatinine      0.6 - 1.3 MG/DL       BUN/Creatinine ratio      12 - 20         GFR est AA      >60 ml/min/1.73m2       GFR est non-AA      >60 ml/min/1.73m2       Calcium      8.5 - 10.1 MG/DL       Bilirubin, total      0.2 - 1.0 MG/DL       ALT (SGPT)      16 - 61 U/L       AST      15 - 37 U/L       Alk.  phosphatase      45 - 117 U/L       Protein, total      6.4 - 8.2 g/dL       Albumin      3.4 - 5.0 g/dL       Globulin      2.0 - 4.0 g/dL       A-G Ratio      0.8 - 1.7         Color             Appearance             Specific gravity      1.005 - 1.030       pH (UA)      5.0 - 8.0         Protein      NEG mg/dL       Glucose      NEG mg/dL       Ketone      NEG mg/dL       Bilirubin      NEG         Blood      NEG         Urobilinogen      0.2 - 1.0 EU/dL       Nitrites      NEG         Leukocyte Esterase      NEG         Cholesterol, total      <200 MG/DL Triglyceride      <150 MG/DL       HDL Cholesterol      40 - 60 MG/DL       LDL, calculated      0 - 100 MG/DL       VLDL, calculated      MG/DL       CHOL/HDL Ratio      0 - 5.0         WBC      0 - 4 /hpf       RBC      0 - 5 /hpf       Epithelial cells      0 - 5 /lpf       Bacteria      NEG /hpf       Prostate Specific Ag      0.0 - 4.0 ng/mL    6.0 (H)   Prostate Specific Ag      0.0 - 4.0 ng/mL 6.9 (H)      TSH      0.36 - 3.74 uIU/mL  2.60       Component      Latest Ref Rng & Units 6/2/2016 7/10/2015 7/9/2014          10:32 AM 12:00 AM 12:00 AM   WBC      4.6 - 13.2 K/uL      RBC      4.70 - 5.50 M/uL      HGB      13.0 - 16.0 g/dL      HCT      36.0 - 48.0 %      MCV      74.0 - 97.0 FL      MCH      24.0 - 34.0 PG      MCHC      31.0 - 37.0 g/dL      RDW      11.6 - 14.5 %      PLATELET      129 - 163 K/uL      MPV      9.2 - 11.8 FL      NEUTROPHILS      40 - 73 %      LYMPHOCYTES      21 - 52 %      MONOCYTES      3 - 10 %      EOSINOPHILS      0 - 5 %      BASOPHILS      0 - 2 %      ABS. NEUTROPHILS      1.8 - 8.0 K/UL      ABS. LYMPHOCYTES      0.9 - 3.6 K/UL      ABS. MONOCYTES      0.05 - 1.2 K/UL      ABS. EOSINOPHILS      0.0 - 0.4 K/UL      ABS. BASOPHILS      0.0 - 0.1 K/UL      DF            Sodium      136 - 145 mmol/L      Potassium      3.5 - 5.5 mmol/L      Chloride      100 - 108 mmol/L      CO2      21 - 32 mmol/L      Anion gap      3.0 - 18 mmol/L      Glucose      74 - 99 mg/dL      BUN      7.0 - 18 MG/DL      Creatinine      0.6 - 1.3 MG/DL      BUN/Creatinine ratio      12 - 20        GFR est AA      >60 ml/min/1.73m2      GFR est non-AA      >60 ml/min/1.73m2      Calcium      8.5 - 10.1 MG/DL      Bilirubin, total      0.2 - 1.0 MG/DL      ALT (SGPT)      16 - 61 U/L      AST      15 - 37 U/L      Alk.  phosphatase      45 - 117 U/L      Protein, total      6.4 - 8.2 g/dL      Albumin      3.4 - 5.0 g/dL      Globulin      2.0 - 4.0 g/dL      A-G Ratio      0.8 - 1.7 Color            Appearance            Specific gravity      1.005 - 1.030      pH (UA)      5.0 - 8.0        Protein      NEG mg/dL      Glucose      NEG mg/dL      Ketone      NEG mg/dL      Bilirubin      NEG        Blood      NEG        Urobilinogen      0.2 - 1.0 EU/dL      Nitrites      NEG        Leukocyte Esterase      NEG        Cholesterol, total      <200 MG/DL      Triglyceride      <150 MG/DL      HDL Cholesterol      40 - 60 MG/DL      LDL, calculated      0 - 100 MG/DL      VLDL, calculated      MG/DL      CHOL/HDL Ratio      0 - 5.0        WBC      0 - 4 /hpf      RBC      0 - 5 /hpf      Epithelial cells      0 - 5 /lpf      Bacteria      NEG /hpf      Prostate Specific Ag      0.0 - 4.0 ng/mL  4.9 (H) 4.5 (H)   Prostate Specific Ag      0.0 - 4.0 ng/mL 3.8     TSH      0.36 - 3.74 uIU/mL        TESTS  ekg  nsr      Assessment/Plan:      castleman dz stable      Diagnoses and all orders for this visit:    1. Routine general medical examination at a health care facility    2. [facility]man disease ([facility]  -     AMB POC EKG ROUTINE W/ 12 LEADS, INTER & REP    3. Elevated cholesterol  -     AMB POC EKG ROUTINE W/ 12 LEADS, INTER & REP    4. Erectile dysfunction due to arterial insufficiency  -     sildenafil citrate (VIAGRA) 100 mg tablet; Take 1 Tab by mouth as needed.           Lab review: labs are reviewed, up to date and normal

## 2018-08-13 NOTE — PROGRESS NOTES
Nakita Gay is a 68 y.o. male presented to clinic for an annual physical exam. Pt denies any pain or other concerns at this time. 1. Have you been to the ER, urgent care clinic since your last visit? Hospitalized since your last visit? No    2. Have you seen or consulted any other health care providers outside of the [address] [place] [address] since your last visit? Include any pap smears or colon screening.  No     Learning Assessment 8/2/2017   PRIMARY LEARNER Patient   HIGHEST LEVEL OF EDUCATION - PRIMARY LEARNER  -   BARRIERS PRIMARY LEARNER -   CO-LEARNER CAREGIVER -   PRIMARY LANGUAGE ENGLISH   LEARNER PREFERENCE PRIMARY READING   ANSWERED BY patient   RELATIONSHIP SELF     Health Maintenance Due   Topic Date Due    DTaP/Tdap/Td series (2 - Td) 02/20/2017    Influenza Age 5 to Adult  08/01/2018    MEDICARE YEARLY EXAM  08/03/2018

## 2018-08-13 NOTE — MR AVS SNAPSHOT
[facility] 
 
 
 [id number] [facility] [address] [facility] [address] [id number] 
[phone] Patient: Rosio Campo MRN: [id number]:[id number] Visit Information Date & Time Provider Department Dept. Phone Encounter #  
 8/13/2018  2:00 PM Gina Lynch [place] [phone] [id number] Follow-up Instructions Return in about 1 year (around 8/13/2019) for MWE, physical, labs prior. Follow-up and Disposition History Your Appointments 8/13/2018  2:00 PM  
Complete [place] [id number] [address]) Appt Note: CPE; Confirmed appt [id number] [facility] [address] [facility] 222 [facility]  
  
   
 [id number] [facility] [address] Upcoming Health Maintenance Date Due DTaP/Tdap/Td series (2 - Td) 2/20/2017 Influenza Age 5 to Adult 8/1/2018 MEDICARE YEARLY EXAM 8/3/2018 GLAUCOMA SCREENING Q2Y 5/29/2020 COLONOSCOPY 12/20/2026 Allergies as of 8/13/2018  Review Complete On: 8/13/2018 By: Gina Lynch DO No Known Allergies Current Immunizations  Reviewed on 10/5/2015 Name Date Influenza High Dose Vaccine PF 9/5/2017, 10/5/2015 Influenza Vaccine 11/20/2012 Pneumococcal Conjugate (PCV-13) 10/5/2015 Pneumococcal Vaccine (Unspecified Type) 11/20/2012 Tdap 2/20/2007 Zoster Vaccine, Live 11/20/2012 Not reviewed this visit You Were Diagnosed With   
  
 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 
ICD-9-CM: 813. 6 Elevated cholesterol     ICD-10-CM: E78.00 ICD-9-CM: 272.0 Erectile dysfunction due to arterial insufficiency     ICD-10-CM: N52.01 
ICD-9-CM: 607.84 Vitals BP Pulse Temp Resp Height(growth percentile) Weight(growth percentile) 132/79 (BP 1 Location: Right arm, BP Patient Position: Sitting) (!) 107 97.8 °F (36.6 °C) (Oral) 16 6' (1.829 m) 243 lb 9.6 oz (110.5 kg) SpO2 BMI Smoking Status 95% 33.04 kg/m2 Never Smoker Vitals History BMI and BSA Data Body Mass Index Body Surface Area 33.04 kg/m 2 2.37 m 2 Preferred Pharmacy Pharmacy Name Phone [facility] [address], [address] [phone] Your Updated Medication List  
  
   
This list is accurate as of 8/13/18  1:22 PM.  Always use your most recent med list.  
  
  
  
  
 Veronica Colón Generic drug:  omega-3 fatty acids Take 1 Cap by mouth daily. multivitamin tablet Commonly known as:  ONE A DAY Take 1 Tab by mouth daily. sildenafil citrate 100 mg tablet Commonly known as:  VIAGRA Take 1 Tab by mouth as needed. Prescriptions Sent to Pharmacy Refills  
 sildenafil citrate (VIAGRA) 100 mg tablet 12 Sig: Take 1 Tab by mouth as needed. Class: Normal  
 Pharmacy: [facility] [address], [address] Ph #: [phone] Route: Oral  
  
We Performed the Following AMB POC EKG ROUTINE W/ 12 LEADS, INTER & REP [55771 CPT(R)] Follow-up Instructions Return in about 1 year (around 8/13/2019) for MWE, physical, labs prior. Introducing [facility]! Dear Virgilio Glover: Thank you for requesting a [employer] account. Our records indicate that you already have an active [employer] account. You can access your account anytime at [email or web address] Did you know that you can access your hospital and ER discharge instructions at any time in [employer]? You can also review all of your test results from your hospital stay or ER visit. Additional Information If you have questions, please visit the Frequently Asked Questions section of the [email or web address] website at [email or web address] Remember, [employer] is NOT to be used for urgent needs. For medical emergencies, dial 911. Now available from your iPhone and Android! Please provide this summary of care documentation to your next provider. Your primary care clinician is listed as [id number] [place] [address]. If you have any questions after today's visit, please call [phone].

## 2018-11-13 ENCOUNTER — DOCUMENTATION ONLY (OUTPATIENT)
Dept: INTERNAL MEDICINE CLINIC | Age: 73
End: 2018-11-13

## 2019-03-05 ENCOUNTER — OFFICE VISIT (OUTPATIENT)
Dept: FAMILY MEDICINE CLINIC | Age: 74
End: 2019-03-05

## 2019-03-05 VITALS
HEIGHT: 72 IN | WEIGHT: 245.4 LBS | BODY MASS INDEX: 33.24 KG/M2 | RESPIRATION RATE: 18 BRPM | SYSTOLIC BLOOD PRESSURE: 125 MMHG | TEMPERATURE: 98.6 F | DIASTOLIC BLOOD PRESSURE: 79 MMHG | OXYGEN SATURATION: 96 % | HEART RATE: 84 BPM

## 2019-03-05 DIAGNOSIS — J06.9 VIRAL UPPER RESPIRATORY TRACT INFECTION: Primary | ICD-10-CM

## 2019-03-05 LAB
QUICKVUE INFLUENZA TEST: NEGATIVE
S PYO AG THROAT QL: NEGATIVE
VALID INTERNAL CONTROL?: YES
VALID INTERNAL CONTROL?: YES

## 2019-03-05 RX ORDER — PROMETHAZINE HYDROCHLORIDE AND CODEINE PHOSPHATE 6.25; 1 MG/5ML; MG/5ML
5 SOLUTION ORAL
Qty: 120 ML | Refills: 1 | Status: SHIPPED | OUTPATIENT
Start: 2019-03-05 | End: 2019-03-12

## 2019-03-05 RX ORDER — CEFUROXIME AXETIL 250 MG/1
250 TABLET ORAL 2 TIMES DAILY
Qty: 20 TAB | Refills: 0 | Status: SHIPPED | OUTPATIENT
Start: 2019-03-05 | End: 2019-03-15

## 2019-03-05 NOTE — PROGRESS NOTES
1. Have you been to the ER, urgent care clinic since your last visit? Hospitalized since your last visit? No    2. Have you seen or consulted any other health care providers outside of the [address] [place] [address] since your last visit? Include any pap smears or colon screening.  No

## 2019-03-05 NOTE — PROGRESS NOTES
Marky Braga is a 69 y.o.  male and presents with    Chief Complaint   Patient presents with    URI           Subjective:  Upper Respiratory Infection  Patient complains of symptoms of a URI. Symptoms include congestion, coryza, sore throat and swollen glands. Onset of symptoms was 3 days ago, gradually worsening since that time. He also c/o achiness for the past 3 day . He is drinking plenty of fluids. . Evaluation to date: none. Treatment to date: none. 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 20 Tab 0    promethazine-codeine (PHENERGAN WITH CODEINE) 6.25-10 mg/5 mL syrup Take 5 mL by mouth four (4) times daily as needed for Cough for up to 7 days. Max Daily Amount: 20 mL. 120 mL 1    sildenafil citrate (VIAGRA) 100 mg tablet Take 1 Tab by mouth as needed. 10 Tab 12    multivitamin (ONE A DAY) tablet Take 1 Tab by mouth daily.  omega-3 fatty acids (FISH OIL) cap Take 1 Cap by mouth daily.        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 Tj Hull     Family History   Problem Relation Age of Onset    Heart Disease Mother      Social History     Tobacco Use    Smoking status: Never Smoker    Smokeless tobacco: Never Used   Substance Use Topics    Alcohol use: No       ROS       All other systems reviewed and are negative. Objective:  Vitals:    03/05/19 1044   BP: 125/79   Pulse: 84   Resp: 18   Temp: 98.6 °F (37 °C)   TempSrc: Oral   SpO2: 96%   Weight: 245 lb 6.4 oz (111.3 kg)   Height: 6' (1.829 m)   PainSc:   5   PainLoc: Abdomen                 alert, well appearing, and in no distress and oriented to person, place, and time  Nose - normal and patent, no erythema, discharge or polyps  Mouth - mucous membranes moist, pharynx normal without lesions  Neck - supple, no significant adenopathy  Lymphatics - no palpable lymphadenopathy, no hepatosplenomegaly  Chest - clear to auscultation, no wheezes, rales or rhonchi, symmetric air entry  Heart - normal rate, regular rhythm, normal S1, S2, no murmurs, rubs, clicks or gallops  Abdomen - soft, nontender, nondistended, no masses or organomegaly        LABS   Strep and flu     TESTS      Assessment/Plan:    [place] with ceftin and phen cod and f/u INI   1 wk      Lab review:     Diagnoses and all orders for this visit:    1. Viral upper respiratory tract infection  -     cefUROXime (CEFTIN) 250 mg tablet; Take 1 Tab by mouth two (2) times a day for 10 days. -     promethazine-codeine (PHENERGAN WITH CODEINE) 6.25-10 mg/5 mL syrup; Take 5 mL by mouth four (4) times daily as needed for Cough for up to 7 days. Max Daily Amount: 20 mL. I have discussed the diagnosis with the patient and the intended plan as seen in the above orders. The patient has received an after-visit summary and questions were answered concerning future plans. I have discussed medication side effects and warnings with the patient as well. I have reviewed the plan of care with the patient, accepted their input and they are in agreement with the treatment goals.      Follow-up Disposition: Not on File

## 2019-07-30 ENCOUNTER — TELEPHONE (OUTPATIENT)
Dept: FAMILY MEDICINE CLINIC | Age: 74
End: 2019-07-30

## 2019-07-30 ENCOUNTER — OFFICE VISIT (OUTPATIENT)
Dept: FAMILY MEDICINE CLINIC | Age: 74
End: 2019-07-30

## 2019-07-30 ENCOUNTER — HOSPITAL ENCOUNTER (OUTPATIENT)
Dept: GENERAL RADIOLOGY | Age: 74
Discharge: HOME | End: 2019-07-30
Payer: MEDICARE

## 2019-07-30 VITALS
SYSTOLIC BLOOD PRESSURE: 150 MMHG | DIASTOLIC BLOOD PRESSURE: 76 MMHG | RESPIRATION RATE: 20 BRPM | HEIGHT: 72 IN | TEMPERATURE: 98 F | WEIGHT: 236 LBS | HEART RATE: 63 BPM | BODY MASS INDEX: 31.97 KG/M2 | OXYGEN SATURATION: 93 %

## 2019-07-30 DIAGNOSIS — N52.01 ERECTILE DYSFUNCTION DUE TO ARTERIAL INSUFFICIENCY: ICD-10-CM

## 2019-07-30 DIAGNOSIS — M25.561 ACUTE PAIN OF RIGHT KNEE: ICD-10-CM

## 2019-07-30 DIAGNOSIS — M25.561 ACUTE PAIN OF RIGHT KNEE: Primary | ICD-10-CM

## 2019-07-30 DIAGNOSIS — M25.562 ACUTE PAIN OF LEFT KNEE: ICD-10-CM

## 2019-07-30 PROCEDURE — 73564 X-RAY EXAM KNEE 4 OR MORE: CPT

## 2019-07-30 RX ORDER — SILDENAFIL 100 MG/1
100 TABLET, FILM COATED ORAL AS NEEDED
Qty: 10 TAB | Refills: 12 | Status: CANCELLED | OUTPATIENT
Start: 2019-07-30

## 2019-07-30 RX ORDER — SILDENAFIL 100 MG/1
100 TABLET, FILM COATED ORAL AS NEEDED
Qty: 90 TAB | Refills: 3 | Status: SHIPPED | OUTPATIENT
Start: 2019-07-30 | End: 2020-12-07 | Stop reason: SDUPTHER

## 2019-07-30 NOTE — TELEPHONE ENCOUNTER
Mr. 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 Has he ever had a fracture of the patella? I can consider Injection and PT, Brace with physical activity. Please schedule for Follow Up.

## 2019-07-30 NOTE — TELEPHONE ENCOUNTER
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 Patient stated that he has he ever had a fracture of the patella. can consider Injection and PT, Brace with physical activity.  He has been scheduled for Follow Up on

## 2019-07-30 NOTE — PROGRESS NOTES
Room #      SUBJECTIVE:    Truman Chandler is a 69 y.o. male who presents today for medication refill and c/o knee pain at a level 10    1. Have you been to the ER, urgent care clinic since your last visit? Hospitalized since your last visit? NO    2. Have you seen or consulted any other health care providers outside of the [address] [place] [address] since your last visit? Include any pap smears or colon screening. NO  When :  Reason:    Health Maintenance reviewed Yes    Health Maintenance Due   Topic Date Due    Shingrix Vaccine Age 49> (1 of 2) 02/20/1996    DTaP/Tdap/Td series (3 - Td) 02/20/2017    MEDICARE YEARLY EXAM  08/14/2019

## 2019-08-01 NOTE — PROGRESS NOTES
Impression / Plan     Diagnoses and all orders for this visit:    1. Acute pain of right knee    2. Erectile dysfunction due to arterial insufficiency    Other orders  -     sildenafil citrate (VIAGRA) 100 mg tablet; Take 1 Tab by mouth as needed (ED). Plan: Suspect OA, moslty symptomatic Patellofemoral Disease, will obtain XR of the LEFT and consider treatment options including Injection Therapy and PT. Advise to continue NSAID's in the meantime. Follow-up and Dispositions    · Return if symptoms worsen or fail to improve. SUSIE Colud is a 69 y.o.male presenting for evaluation of LEFT Knee Pain. Onset of symptoms really 3 years ago, however, worse within last 1 week. Location of most pain Anterior, worse with kneeling, and standing from sitting. Positive clicking, No locking or sensation of instability. Positive soft tissue edema, No joint effusion. No ecchymosis. Denies numbness or tingling. No vascular symptoms. No surgery or Injection Therapy. Tylenol and NSAID's helpful. Presents with standard care to the OFFICE. Medications     Outpatient Medications Prior to Visit   Medication Sig Dispense Refill    multivitamin (ONE A DAY) tablet Take 1 Tab by mouth daily.  omega-3 fatty acids (FISH OIL) cap Take 1 Cap by mouth daily.  sildenafil citrate (VIAGRA) 100 mg tablet Take 1 Tab by mouth as needed. 10 Tab 12     No facility-administered medications prior to visit.          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 Venkatesh Dunn     Family History   Problem Relation Age of Onset    Heart Disease Mother        Social History     Social History     Socioeconomic History    Marital status:      Spouse name: Not on file    Number of children: Not on file    Years of education: Not on file    Highest education level: Not on file   Occupational History    Not on file   Social Needs    Financial resource strain: Not on file    Food insecurity:     Worry: Not on file     Inability: Not on file    Transportation needs:     Medical: Not on file     Non-medical: Not on file   Tobacco Use    Smoking status: Never Smoker    Smokeless tobacco: Never Used   Substance and Sexual Activity    Alcohol use: No    Drug use: No    Sexual activity: Yes   Lifestyle    Physical activity:     Days per week: Not on file     Minutes per session: Not on file    Stress: Not on file   Relationships    Social connections:     Talks on phone: Not on file     Gets together: Not on file     Attends Anabaptist service: Not on file     Active member of club or organization: Not on file     Attends meetings of clubs or organizations: Not on file     Relationship status: Not on file    Intimate partner violence:     Fear of current or ex partner: Not on file     Emotionally abused: Not on file     Physically abused: Not on file     Forced sexual activity: Not on file   Other Topics Concern    Not on file   Social History Narrative    Not on file        ROS   Review of Systems    10 Element ROS negative unless specifically stated in History of Present Illness.      Health Maintenance     Health Maintenance   Topic Date Due    Shingrix Vaccine Age 49> (1 of 2) 02/20/1996    DTaP/Tdap/Td series (3 - Td) 02/20/2017    Influenza Age 9 to Adult  08/01/2019    MEDICARE YEARLY EXAM  08/14/2019    GLAUCOMA SCREENING Q2Y  05/29/2020    COLONOSCOPY  12/20/2026    Pneumococcal 65+ years  Completed    Hepatitis C Screening  Addressed        Physical Exam   /76 (BP 1 Location: Left arm, BP Patient Position: Sitting)   Pulse 63   Temp 98 °F (36.7 °C) (Oral)   Resp 20   Ht 6' (1.829 m)   Wt 236 lb (107 kg)   SpO2 93%   BMI 32.01 kg/m²       Physical Exam   Constitutional: He is oriented to person, place, and time. He appears well-developed and well-nourished. He appears distressed. Mild Pain Distress. HENT:   Head: Normocephalic and atraumatic. Eyes: Pupils are equal, round, and reactive to light. Conjunctivae and EOM are normal.   Musculoskeletal:        Left knee: He exhibits no effusion. Neurological: He is alert and oriented to person, place, and time. He has normal reflexes. No cranial nerve deficit. Coordination abnormal.   Skin: Skin is warm and dry. No rash noted. No erythema. Psychiatric: He has a normal mood and affect. His behavior is normal.     Left Knee Exam     Tenderness   The patient is experiencing tenderness in the patella. Range of Motion   The patient has normal left knee ROM. Tests   Jose:  Lateral - negative  Varus: negative Valgus: negative  Lachman:  Anterior - negative    Posterior - negative  Patellar apprehension: positive    Other   Erythema: absent  Scars: absent  Sensation: normal  Pulse: present  Swelling: mild  Effusion: no effusion present    Comments:  Patellofemoral Crepitus.              Orvis Landing, DO

## 2019-08-06 ENCOUNTER — TELEPHONE (OUTPATIENT)
Dept: FAMILY MEDICINE CLINIC | Age: 74
End: 2019-08-06

## 2019-08-06 NOTE — TELEPHONE ENCOUNTER
Patient is requesting lab orders put in today for the patient, he is waiting across the street at the hospital. Patient has physical next week Please advise, thank you.

## 2019-08-06 NOTE — TELEPHONE ENCOUNTER
Returned call to Mr Amy Stokes to let him know that Dr. Felisa Flowers needs to see him first prior to him doing any labs, Mr. Amy Stokes was irate as he stated that he has seen Dr. Mehdi Carrasco for the past 10 years and he has always done it before the visit. Explained to Mr. Amy Stokes that every doctor does things differently. Patient did not have any other question and I reminded him that his next appointment is on 8/14/2019.  Which he verified closing encounter

## 2019-08-14 ENCOUNTER — OFFICE VISIT (OUTPATIENT)
Dept: FAMILY MEDICINE CLINIC | Age: 74
End: 2019-08-14

## 2019-08-14 ENCOUNTER — HOSPITAL ENCOUNTER (OUTPATIENT)
Dept: LAB | Age: 74
Discharge: HOME | End: 2019-08-14
Payer: MEDICARE

## 2019-08-14 VITALS
RESPIRATION RATE: 20 BRPM | WEIGHT: 235.6 LBS | HEIGHT: 72 IN | HEART RATE: 87 BPM | TEMPERATURE: 98.2 F | OXYGEN SATURATION: 97 % | SYSTOLIC BLOOD PRESSURE: 123 MMHG | DIASTOLIC BLOOD PRESSURE: 77 MMHG | BODY MASS INDEX: 31.91 KG/M2

## 2019-08-14 DIAGNOSIS — R97.20 ELEVATED PSA: Primary | ICD-10-CM

## 2019-08-14 DIAGNOSIS — Z87.438 HISTORY OF BPH: ICD-10-CM

## 2019-08-14 DIAGNOSIS — N28.89 RENAL MASS: ICD-10-CM

## 2019-08-14 DIAGNOSIS — E78.00 PURE HYPERCHOLESTEROLEMIA: ICD-10-CM

## 2019-08-14 LAB
ALBUMIN SERPL-MCNC: 3.8 G/DL (ref 3.4–5)
ALBUMIN/GLOB SERPL: 1 {RATIO} (ref 0.8–1.7)
ALP SERPL-CCNC: 60 U/L (ref 45–117)
ALT SERPL-CCNC: 19 U/L (ref 16–61)
ANION GAP SERPL CALC-SCNC: 5 MMOL/L (ref 3–18)
APPEARANCE UR: CLEAR
AST SERPL-CCNC: 10 U/L (ref 10–38)
BILIRUB SERPL-MCNC: 0.3 MG/DL (ref 0.2–1)
BILIRUB UR QL: NEGATIVE
BUN SERPL-MCNC: 16 MG/DL (ref 7–18)
BUN/CREAT SERPL: 13 (ref 12–20)
CALCIUM SERPL-MCNC: 9 MG/DL (ref 8.5–10.1)
CHLORIDE SERPL-SCNC: 108 MMOL/L (ref 100–111)
CHOLEST SERPL-MCNC: 182 MG/DL
CO2 SERPL-SCNC: 28 MMOL/L (ref 21–32)
COLOR UR: YELLOW
CREAT SERPL-MCNC: 1.23 MG/DL (ref 0.6–1.3)
ERYTHROCYTE [DISTWIDTH] IN BLOOD BY AUTOMATED COUNT: 12.6 % (ref 11.6–14.5)
GLOBULIN SER CALC-MCNC: 3.9 G/DL (ref 2–4)
GLUCOSE SERPL-MCNC: 118 MG/DL (ref 74–99)
GLUCOSE UR STRIP.AUTO-MCNC: NEGATIVE MG/DL
HCT VFR BLD AUTO: 37.6 % (ref 36–48)
HDLC SERPL-MCNC: 48 MG/DL (ref 40–60)
HDLC SERPL: 3.8 {RATIO} (ref 0–5)
HGB BLD-MCNC: 12.3 G/DL (ref 13–16)
HGB UR QL STRIP: NEGATIVE
KETONES UR QL STRIP.AUTO: NEGATIVE MG/DL
LDLC SERPL CALC-MCNC: 113.2 MG/DL (ref 0–100)
LEUKOCYTE ESTERASE UR QL STRIP.AUTO: NEGATIVE
LIPID PROFILE,FLP: ABNORMAL
MCH RBC QN AUTO: 29.1 PG (ref 24–34)
MCHC RBC AUTO-ENTMCNC: 32.7 G/DL (ref 31–37)
MCV RBC AUTO: 88.9 FL (ref 74–97)
NITRITE UR QL STRIP.AUTO: NEGATIVE
PH UR STRIP: 5.5 [PH] (ref 5–8)
PLATELET # BLD AUTO: 309 K/UL (ref 135–420)
PMV BLD AUTO: 9.8 FL (ref 9.2–11.8)
POTASSIUM SERPL-SCNC: 3.7 MMOL/L (ref 3.5–5.5)
PROT SERPL-MCNC: 7.7 G/DL (ref 6.4–8.2)
PROT UR STRIP-MCNC: NEGATIVE MG/DL
RBC # BLD AUTO: 4.23 M/UL (ref 4.7–5.5)
SODIUM SERPL-SCNC: 141 MMOL/L (ref 136–145)
SP GR UR REFRACTOMETRY: 1.03 (ref 1–1.03)
TRIGL SERPL-MCNC: 104 MG/DL (ref ?–150)
UROBILINOGEN UR QL STRIP.AUTO: 0.2 EU/DL (ref 0.2–1)
VLDLC SERPL CALC-MCNC: 20.8 MG/DL
WBC # BLD AUTO: 6.4 K/UL (ref 4.6–13.2)

## 2019-08-14 PROCEDURE — 81003 URINALYSIS AUTO W/O SCOPE: CPT

## 2019-08-14 PROCEDURE — 36415 COLL VENOUS BLD VENIPUNCTURE: CPT

## 2019-08-14 PROCEDURE — 84153 ASSAY OF PSA TOTAL: CPT

## 2019-08-14 PROCEDURE — 85027 COMPLETE CBC AUTOMATED: CPT

## 2019-08-14 PROCEDURE — 84154 ASSAY OF PSA FREE: CPT

## 2019-08-14 PROCEDURE — 80053 COMPREHEN METABOLIC PANEL: CPT

## 2019-08-14 PROCEDURE — 80061 LIPID PANEL: CPT

## 2019-08-14 NOTE — PROGRESS NOTES
Impression / Plan     Diagnoses and all orders for this visit:    1. Elevated PSA  -     CBC W/O DIFF; Future  -     PSA W/ REFLX FREE PSA; Future  -     URINALYSIS W/ RFLX MICROSCOPIC; Future    2. Pure hypercholesterolemia  -     LIPID PANEL; Future  -     METABOLIC PANEL, COMPREHENSIVE; Future    3. Renal mass  -     US RETROPERITONEUM LTD; Future    4. History of BPH    Plan: History of BPH, Asymptomatic, will obtain Follow Up PSA and UA with prior Increase to Monitor, may consider Urology appointment for Cystoscopy. Also, will obtain Retroperitoneal US ATTN: RIGHT Kidney to evaluate Renal Mass. May benefit from [address] per guidelines, will obtain Lipid Panel and LFT's today, add CBC and BMP. Due to have Follow Up Colonoscopy 12/2021 per  Amy Stokes. Will defer LEFT Knee Injection today and continue with NSAID's PRN and given Instructions for HEP, may consider in the future. Follow-up and Dispositions    · Return in about 6 months (around 2/14/2020). SUSIE Beltran is a 69 y.o.male presenting for CPE. No major complaints, will Follow Up on LEFT knee today from 7/30/2019. Reports almost No pain today, even with Line Dance. No mechanical symptoms. No joint effusion. No ecchymosis. NSAID's have been helpful. In review of CT 1/7/2016, S/P Appendectomy, however, evidence of RIGHT Renal Mass, BPH. Denies symptoms of BPH, however, PSA-6.9 1/14/2016. Plan to obtain Retroperitoneal US and repeat PSA, UA today. EKG WNL 1/14/2016. Has ACP and POA, Wife. Due to have Colonoscopy 12/2016. Medications     Outpatient Medications Prior to Visit   Medication Sig Dispense Refill    sildenafil citrate (VIAGRA) 100 mg tablet Take 1 Tab by mouth as needed (ED). 90 Tab 3    multivitamin (ONE A DAY) tablet Take 1 Tab by mouth daily.  omega-3 fatty acids (FISH OIL) cap Take 1 Cap by mouth daily. No facility-administered medications prior to visit.          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 Ford Nguyễn     Family History   Problem Relation Age of Onset    Heart Disease Mother        Social History     Social History     Socioeconomic History    Marital status:      Spouse name: Not on file    Number of children: Not on file    Years of education: Not on file    Highest education level: Not on file   Occupational History    Not on file   Social Needs    Financial resource strain: Not on file    Food insecurity:     Worry: Not on file     Inability: Not on file    Transportation needs:     Medical: Not on file     Non-medical: Not on file   Tobacco Use    Smoking status: Never Smoker    Smokeless tobacco: Never Used   Substance and Sexual Activity    Alcohol use: No    Drug use: No    Sexual activity: Yes   Lifestyle    Physical activity:     Days per week: Not on file     Minutes per session: Not on file    Stress: Not on file   Relationships    Social connections:     Talks on phone: Not on file     Gets together: Not on file     Attends Jewish service: Not on file     Active member of club or organization: Not on file     Attends meetings of clubs or organizations: Not on file     Relationship status: Not on file    Intimate partner violence:     Fear of current or ex partner: Not on file     Emotionally abused: Not on file     Physically abused: Not on file     Forced sexual activity: Not on file Other Topics Concern    Not on file   Social History Narrative    Not on file     ROS   Review of Systems    10 Element ROS negative unless specifically stated in History of Present Illness. Health Maintenance     Health Maintenance   Topic Date Due    Shingrix Vaccine Age 49> (1 of 2) 02/20/1996    DTaP/Tdap/Td series (3 - Td) 02/20/2017    Influenza Age 5 to Adult  08/01/2019    MEDICARE YEARLY EXAM  08/14/2019    GLAUCOMA SCREENING Q2Y  05/29/2020    COLONOSCOPY  12/20/2026    Pneumococcal 65+ years  Completed    Hepatitis C Screening  Addressed        Physical Exam   /77 (BP 1 Location: Right arm, BP Patient Position: Sitting)   Pulse 87   Temp 98.2 °F (36.8 °C) (Oral)   Resp 20   Ht 6' (1.829 m)   Wt 235 lb 9.6 oz (106.9 kg)   SpO2 97%   BMI 31.95 kg/m²       Physical Exam   Constitutional: He is oriented to person, place, and time. He appears well-developed and well-nourished. No distress. HENT:   Head: Normocephalic and atraumatic. Right Ear: External ear normal.   Left Ear: External ear normal.   Nose: Nose normal.   Mouth/Throat: Oropharynx is clear and moist. No oropharyngeal exudate. Eyes: Pupils are equal, round, and reactive to light. Conjunctivae and EOM are normal.   Cardiovascular: Normal rate, regular rhythm, normal heart sounds and intact distal pulses. No murmur heard. Pulmonary/Chest: Effort normal and breath sounds normal. No respiratory distress. He has no wheezes. Neurological: He is alert and oriented to person, place, and time. No cranial nerve deficit. Coordination normal.   Skin: Skin is warm and dry. No rash noted. No erythema. Psychiatric: He has a normal mood and affect.  His behavior is normal.     Ortho Exam    Sarah Pro DO

## 2019-08-14 NOTE — PROGRESS NOTES
Room #  5    SUBJECTIVE:    Jose Miguel Sommers is a 69 y.o. male who presents today for complete physical.n patient c/o knee pain at level 6    1. Have you been to the ER, urgent care clinic since your last visit? Hospitalized since your last visit? NO    2. Have you seen or consulted any other health care providers outside of the [facility] since your last visit? Include any pap smears or colon screening. NO  When :  Reason:    Health Maintenance reviewed Yes    Health Maintenance Due   Topic Date Due    Shingrix Vaccine Age 49> (1 of 2) 02/20/1996    DTaP/Tdap/Td series (3 - Td) 02/20/2017    Influenza Age 9 to Adult  08/01/2019    MEDICARE YEARLY EXAM  08/14/2019

## 2019-08-15 LAB
% FREE PSA, 480797: 14.4 %
PSA SERPL-MCNC: 8 NG/ML (ref 0–4)
PSA, FREE, 480853: 1.15 NG/ML
REFLEX CRITERIA: ABNORMAL

## 2019-08-20 ENCOUNTER — HOSPITAL ENCOUNTER (OUTPATIENT)
Dept: ULTRASOUND IMAGING | Age: 74
Discharge: HOME | End: 2019-08-20
Attending: INTERNAL MEDICINE
Payer: MEDICARE

## 2019-08-20 DIAGNOSIS — N28.89 RENAL MASS: ICD-10-CM

## 2019-08-20 PROCEDURE — 76775 US EXAM ABDO BACK WALL LIM: CPT

## 2019-08-22 ENCOUNTER — TELEPHONE (OUTPATIENT)
Dept: FAMILY MEDICINE CLINIC | Age: 74
End: 2019-08-22

## 2019-08-22 NOTE — TELEPHONE ENCOUNTER
Called Mr. Stacey Chaves (identified self and office, obtained two patient identifiers of name and date of birth.) let him know that his Renal US shows a stable LEFT Renal Mass, which would suggest Benign, however, recommend Follow Up CT Renal Mass in 6-12 Months to Monitor per recommend of Radiology. Left a message for him to contact the office at his earliest convenience.

## 2019-08-22 NOTE — TELEPHONE ENCOUNTER
Please let Mr. Robbins Backbone know that his Renal US shows a stable LEFT Renal Mass, which would suggest Benign, however, recommend Follow Up CT Renal Mass in 6-12 Months to Monitor per recommend of Radiology. Thank you.

## 2020-12-07 ENCOUNTER — VIRTUAL VISIT (OUTPATIENT)
Dept: FAMILY MEDICINE CLINIC | Age: 75
End: 2020-12-07
Payer: MEDICARE

## 2020-12-07 DIAGNOSIS — E78.00 HYPERCHOLESTEREMIA: ICD-10-CM

## 2020-12-07 DIAGNOSIS — R03.0 ELEVATED BLOOD PRESSURE READING: ICD-10-CM

## 2020-12-07 DIAGNOSIS — Z00.00 MEDICARE ANNUAL WELLNESS VISIT, INITIAL: Primary | ICD-10-CM

## 2020-12-07 DIAGNOSIS — N52.01 ERECTILE DYSFUNCTION DUE TO ARTERIAL INSUFFICIENCY: ICD-10-CM

## 2020-12-07 DIAGNOSIS — Z71.89 ADVANCE CARE PLANNING: ICD-10-CM

## 2020-12-07 DIAGNOSIS — M17.12 ARTHRITIS OF LEFT KNEE: ICD-10-CM

## 2020-12-07 PROCEDURE — G8536 NO DOC ELDER MAL SCRN: HCPCS | Performed by: FAMILY MEDICINE

## 2020-12-07 PROCEDURE — G8421 BMI NOT CALCULATED: HCPCS | Performed by: FAMILY MEDICINE

## 2020-12-07 PROCEDURE — 99213 OFFICE O/P EST LOW 20 MIN: CPT | Performed by: FAMILY MEDICINE

## 2020-12-07 PROCEDURE — G8432 DEP SCR NOT DOC, RNG: HCPCS | Performed by: FAMILY MEDICINE

## 2020-12-07 PROCEDURE — 1101F PT FALLS ASSESS-DOCD LE1/YR: CPT | Performed by: FAMILY MEDICINE

## 2020-12-07 PROCEDURE — 3017F COLORECTAL CA SCREEN DOC REV: CPT | Performed by: FAMILY MEDICINE

## 2020-12-07 PROCEDURE — G8428 CUR MEDS NOT DOCUMENT: HCPCS | Performed by: FAMILY MEDICINE

## 2020-12-07 PROCEDURE — 99497 ADVNCD CARE PLAN 30 MIN: CPT | Performed by: FAMILY MEDICINE

## 2020-12-07 PROCEDURE — G0438 PPPS, INITIAL VISIT: HCPCS | Performed by: FAMILY MEDICINE

## 2020-12-07 RX ORDER — SILDENAFIL 100 MG/1
100 TABLET, FILM COATED ORAL AS NEEDED
Qty: 90 TAB | Refills: 3 | Status: SHIPPED | OUTPATIENT
Start: 2020-12-07 | End: 2020-12-08 | Stop reason: SDUPTHER

## 2020-12-07 NOTE — PROGRESS NOTES
Zachary Swanson presents today for   Chief Complaint   Patient presents with    Medication Refill       Is someone accompanying this pt? no    Is the patient using any DME equipment during OV? no    Depression Screening:  3 most recent PHQ Screens 12/7/2020   PHQ Not Done -   Little interest or pleasure in doing things Not at all   Feeling down, depressed, irritable, or hopeless Not at all   Total Score PHQ 2 0       Learning Assessment:  Learning Assessment 8/2/2017   PRIMARY LEARNER Patient   HIGHEST LEVEL OF EDUCATION - PRIMARY LEARNER  -   BARRIERS PRIMARY LEARNER -   CO-LEARNER CAREGIVER -   PRIMARY LANGUAGE ENGLISH   LEARNER PREFERENCE PRIMARY READING   ANSWERED BY patient   RELATIONSHIP SELF       Abuse Screening:  Abuse Screening Questionnaire 12/7/2020   Do you ever feel afraid of your partner? N   Are you in a relationship with someone who physically or mentally threatens you? N   Is it safe for you to go home? Y       Fall Risk  Fall Risk Assessment, last 12 mths 12/7/2020   Able to walk? Yes   Fall in past 12 months? No       Health Maintenance reviewed and discussed and ordered per Provider. Health Maintenance Due   Topic Date Due    Shingrix Vaccine Age 49> (1 of 2) 02/20/1996    DTaP/Tdap/Td series (3 - Td) 02/20/2017    Medicare Yearly Exam  08/14/2019    GLAUCOMA SCREENING Q2Y  05/29/2020    Flu Vaccine (1) 09/01/2020   . Coordination of Care:  1. Have you been to the ER, urgent care clinic since your last visit? Hospitalized since your last visit? no    2. Have you seen or consulted any other health care providers outside of the [address] [place] [address] since your last visit? Include any pap smears or colon screening.  no      Last  Checked n/a  Last UDS Checked n/a  Last Pain contract signed: n/a

## 2020-12-07 NOTE — ACP (ADVANCE CARE PLANNING)
Advance Care Planning     Advance Care Planning (ACP) Physician/NP/PA Conversation      Date of Conversation: 12/7/2020  Conducted with: Patient with Decision Making Capacity    Healthcare Decision Maker:     Primary Decision Maker: Ebonie Wahl - [phone]    Secondary Decision Maker: Hair Haile - Daughter  Click here to complete [address] including selection of the Healthcare Decision Maker Relationship (ie \"Primary\")  Today we documented Decision Maker(s) consistent with Legal Next of Kin hierarchy. Care Preferences:    Hospitalization: \"If your health worsens and it becomes clear that your chance of recovery is unlikely, what would be your preference regarding hospitalization? \"  The patient would prefer hospitalization. Ventilation: \"If you were unable to breathe on your own and your chance of recovery was unlikely, what would be your preference about the use of a ventilator (breathing machine) if it was available to you? \"   The patient would desire the use of a ventilator. Resuscitation: \"In the event your heart stopped as a result of an underlying serious health condition, would you want attempts to be made to restart your heart, or would you prefer a natural death? \"   Yes, attempt to resuscitate.     Additional topics discussed: treatment goals, benefit/burden of treatment options and artificial nutrition    Conversation Outcomes / Follow-Up Plan:   ACP in process - information provided, considering goals and options  Reviewed DNR/DNI and patient elects Full Code (Attempt Resuscitation)     Length of Voluntary ACP Conversation in minutes:  16 minutes    Kati Peng MD

## 2020-12-08 ENCOUNTER — TELEPHONE (OUTPATIENT)
Dept: FAMILY MEDICINE CLINIC | Age: 75
End: 2020-12-08

## 2020-12-08 DIAGNOSIS — N52.01 ERECTILE DYSFUNCTION DUE TO ARTERIAL INSUFFICIENCY: ICD-10-CM

## 2020-12-08 RX ORDER — SILDENAFIL 100 MG/1
100 TABLET, FILM COATED ORAL AS NEEDED
Qty: 30 TAB | Refills: 0 | Status: SHIPPED | OUTPATIENT
Start: 2020-12-08 | End: 2022-02-21

## 2020-12-08 NOTE — TELEPHONE ENCOUNTER
Pt called in and was wanting to know if he could please have the Viagra sent over to the [facility] instead of the [facility]. He was wanting to know if that could be done today. Dr. Colonel Duncan had sent it to the [facility] pharmacy but he stated it was out of town and needs it sent to the [facility]. Please advise.

## 2021-03-30 ENCOUNTER — VIRTUAL VISIT (OUTPATIENT)
Dept: FAMILY MEDICINE CLINIC | Age: 76
End: 2021-03-30
Payer: MEDICARE

## 2021-03-30 DIAGNOSIS — D47.Z2 CASTLEMAN DISEASE (HCC): ICD-10-CM

## 2021-03-30 DIAGNOSIS — E78.00 HYPERCHOLESTEREMIA: ICD-10-CM

## 2021-03-30 DIAGNOSIS — R10.31 CHRONIC RIGHT LOWER QUADRANT PAIN: Primary | ICD-10-CM

## 2021-03-30 DIAGNOSIS — G89.29 CHRONIC RIGHT LOWER QUADRANT PAIN: Primary | ICD-10-CM

## 2021-03-30 DIAGNOSIS — Z90.49 HISTORY OF APPENDECTOMY: ICD-10-CM

## 2021-03-30 PROCEDURE — G8427 DOCREV CUR MEDS BY ELIG CLIN: HCPCS | Performed by: FAMILY MEDICINE

## 2021-03-30 PROCEDURE — 3017F COLORECTAL CA SCREEN DOC REV: CPT | Performed by: FAMILY MEDICINE

## 2021-03-30 PROCEDURE — 1101F PT FALLS ASSESS-DOCD LE1/YR: CPT | Performed by: FAMILY MEDICINE

## 2021-03-30 PROCEDURE — G8510 SCR DEP NEG, NO PLAN REQD: HCPCS | Performed by: FAMILY MEDICINE

## 2021-03-30 PROCEDURE — 99214 OFFICE O/P EST MOD 30 MIN: CPT | Performed by: FAMILY MEDICINE

## 2021-03-30 RX ORDER — IBUPROFEN 800 MG/1
800 TABLET ORAL
Qty: 30 TAB | Refills: 1 | Status: SHIPPED | OUTPATIENT
Start: 2021-03-30 | End: 2022-03-15

## 2021-03-30 NOTE — PROGRESS NOTES
Alberto Joaquin is a 77 y.o.  male and presents with    Chief Complaint   Patient presents with    Leg Pain     right thigh     Alberto Joaquin, who was evaluated through a synchronous (real-time) audio-video encounter, and/or his healthcare decision maker, is aware that it is a billable service, with coverage as determined by his insurance carrier. He provided verbal consent to proceed: Yes, and patient identification was verified. This visit was conducted pursuant to the emergency declaration under the [address] [place] [address] and the [employer] and [employer] General Act. A caregiver was present when appropriate. Ability to conduct physical exam was limited. The patient was located in a state where the provider was credentialed to provide care. --Yovany Covarrubias MD on 3/30/2021 at 9:16 AM    Subjective:  he is c/o right lower quadrant abdominal pain; he had appendectomy and has had the pain intermittently for 3-4 months. he has increase in pain area; pain does not radiate; he denies diarrhea or constipation; he has increased gas and takes 2 simethicone and the symptoms resolve. 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 he denies discoloration of the skin. he denies straining during bowel movement. he denies heavy lifting in the past 3-4 months; he has history of umbilical hernia. he denies pain in his groin.       ROS   General ROS: negative for - chills, fatigue or fever  Psychological ROS: negative for - anxiety or depression  Ophthalmic ROS: positive for - cataract; he wears glasses  ENT ROS: negative for - headaches, nasal congestion or sore throat  Endocrine ROS: negative for - polydipsia/polyuria or temperature intolerance  Respiratory ROS: no cough, shortness of breath, or wheezing  Cardiovascular ROS: no chest pain or dyspnea on exertion  Gastrointestinal ROS: no abdominal pain, change in bowel habits, or black or bloody stools  Genito-Urinary ROS: no dysuria, trouble voiding, or hematuria  Musculoskeletal ROS: negative for - muscle pain or muscular weakness; positive for - left knee pain  Neurological ROS: negative for - numbness/tingling or weakness  Dermatological ROS: negative for - rash or skin lesion changes     All other systems reviewed and are negative. Objective: There were no vitals filed for this visit. alert, well appearing, and in no distress, oriented to person, place, and time and overweight  Mental status - normal mood, behavior, speech, dress, motor activity, and thought processes  Chest - normal work of breathing  Neurological - cranial nerves II through XII intact    LABS     TESTS      Assessment/Plan:    1. Chronic right lower quadrant pain  History of surgery; he denies constant pain; recommend nsaid to determine if condition is inflammatory; ddx meralgia paresthetica,   - ibuprofen (MOTRIN) 800 mg tablet; Take 1 Tab by mouth every eight (8) hours as needed for Pain. 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 History of appendectomy      4. Hypercholesteremia  Lipid panel pending      Lab review: orders written for new lab studies as appropriate; see orders      I have discussed the diagnosis with the patient and the intended plan as seen in the above orders. I have discussed medication side effects and warnings with the patient as well. I have reviewed the plan of care with the patient, accepted their input and they are in agreement with the treatment goals.

## 2021-03-30 NOTE — PROGRESS NOTES
Megha Horta presents today for   Chief Complaint   Patient presents with    Leg Pain     right thigh       Is someone accompanying this pt? na    Is the patient using any DME equipment during OV? na    Depression Screening:  3 most recent PHQ Screens 12/7/2020   PHQ Not Done -   Little interest or pleasure in doing things Not at all   Feeling down, depressed, irritable, or hopeless Not at all   Total Score PHQ 2 0       Learning Assessment:  Learning Assessment 8/2/2017   PRIMARY LEARNER Patient   HIGHEST LEVEL OF EDUCATION - PRIMARY LEARNER  -   BARRIERS PRIMARY LEARNER -   CO-LEARNER CAREGIVER -   PRIMARY LANGUAGE ENGLISH   LEARNER PREFERENCE PRIMARY READING   ANSWERED BY patient   RELATIONSHIP SELF       Abuse Screening:  Abuse Screening Questionnaire 12/7/2020   Do you ever feel afraid of your partner? N   Are you in a relationship with someone who physically or mentally threatens you? N   Is it safe for you to go home? Y       Fall Risk  Fall Risk Assessment, last 12 mths 12/7/2020   Able to walk? Yes   Fall in past 12 months? No       Health Maintenance reviewed and discussed and ordered per Provider. Health Maintenance Due   Topic Date Due    COVID-19 Vaccine (1) Never done    Shingrix Vaccine Age 50> (1 of 2) Never done    DTaP/Tdap/Td series (3 - Td) 02/20/2017    Flu Vaccine (1) 09/01/2020   . Coordination of Care:  1. Have you been to the ER, urgent care clinic since your last visit? Hospitalized since your last visit? no    2. Have you seen or consulted any other health care providers outside of the [address] [place] [address] since your last visit? Include any pap smears or colon screening.  no      Last  Checked na  Last UDS Checked na  Last Pain contract signed: na    Patients concerns today:  Pain right thigh

## 2021-04-02 ENCOUNTER — HOSPITAL ENCOUNTER (OUTPATIENT)
Dept: LAB | Age: 76
Discharge: HOME | End: 2021-04-02
Payer: MEDICARE

## 2021-04-02 ENCOUNTER — PATIENT MESSAGE (OUTPATIENT)
Dept: FAMILY MEDICINE CLINIC | Age: 76
End: 2021-04-02

## 2021-04-02 DIAGNOSIS — R03.0 ELEVATED BLOOD PRESSURE READING: ICD-10-CM

## 2021-04-02 DIAGNOSIS — E78.00 HYPERCHOLESTEREMIA: ICD-10-CM

## 2021-04-02 LAB
ANION GAP SERPL CALC-SCNC: 8 MMOL/L (ref 3–18)
BUN SERPL-MCNC: 17 MG/DL (ref 7–18)
BUN/CREAT SERPL: 16 (ref 12–20)
CALCIUM SERPL-MCNC: 9.3 MG/DL (ref 8.5–10.1)
CHLORIDE SERPL-SCNC: 108 MMOL/L (ref 100–111)
CHOLEST SERPL-MCNC: 205 MG/DL
CO2 SERPL-SCNC: 26 MMOL/L (ref 21–32)
CREAT SERPL-MCNC: 1.07 MG/DL (ref 0.6–1.3)
GLUCOSE SERPL-MCNC: 96 MG/DL (ref 74–99)
HDLC SERPL-MCNC: 56 MG/DL (ref 40–60)
HDLC SERPL: 3.7 {RATIO} (ref 0–5)
LDLC SERPL CALC-MCNC: 134 MG/DL (ref 0–100)
LIPID PROFILE,FLP: ABNORMAL
POTASSIUM SERPL-SCNC: 4.2 MMOL/L (ref 3.5–5.5)
SODIUM SERPL-SCNC: 142 MMOL/L (ref 136–145)
TRIGL SERPL-MCNC: 75 MG/DL (ref ?–150)
VLDLC SERPL CALC-MCNC: 15 MG/DL

## 2021-04-02 PROCEDURE — 36415 COLL VENOUS BLD VENIPUNCTURE: CPT

## 2021-04-02 PROCEDURE — 80061 LIPID PANEL: CPT

## 2021-04-02 PROCEDURE — 80048 BASIC METABOLIC PNL TOTAL CA: CPT

## 2022-02-19 DIAGNOSIS — N52.01 ERECTILE DYSFUNCTION DUE TO ARTERIAL INSUFFICIENCY: ICD-10-CM

## 2022-02-21 RX ORDER — SILDENAFIL 100 MG/1
TABLET, FILM COATED ORAL
Qty: 30 TABLET | Refills: 0 | Status: SHIPPED | OUTPATIENT
Start: 2022-02-21 | End: 2022-03-15

## 2022-03-15 ENCOUNTER — OFFICE VISIT (OUTPATIENT)
Dept: FAMILY MEDICINE CLINIC | Age: 77
End: 2022-03-15
Payer: MEDICARE

## 2022-03-15 VITALS
TEMPERATURE: 98.2 F | OXYGEN SATURATION: 96 % | HEART RATE: 91 BPM | DIASTOLIC BLOOD PRESSURE: 78 MMHG | SYSTOLIC BLOOD PRESSURE: 142 MMHG | HEIGHT: 72 IN | WEIGHT: 250 LBS | BODY MASS INDEX: 33.86 KG/M2 | RESPIRATION RATE: 17 BRPM

## 2022-03-15 DIAGNOSIS — Z12.5 PROSTATE CANCER SCREENING: ICD-10-CM

## 2022-03-15 DIAGNOSIS — R97.20 ELEVATED PSA: ICD-10-CM

## 2022-03-15 DIAGNOSIS — R03.0 ELEVATED BLOOD PRESSURE READING: ICD-10-CM

## 2022-03-15 DIAGNOSIS — D47.Z2 CASTLEMAN DISEASE (HCC): ICD-10-CM

## 2022-03-15 DIAGNOSIS — E78.00 HYPERCHOLESTEREMIA: ICD-10-CM

## 2022-03-15 DIAGNOSIS — Z00.00 MEDICARE ANNUAL WELLNESS VISIT, SUBSEQUENT: Primary | ICD-10-CM

## 2022-03-15 DIAGNOSIS — Z71.89 ADVANCE CARE PLANNING: ICD-10-CM

## 2022-03-15 DIAGNOSIS — N52.01 ERECTILE DYSFUNCTION DUE TO ARTERIAL INSUFFICIENCY: ICD-10-CM

## 2022-03-15 PROCEDURE — G0439 PPPS, SUBSEQ VISIT: HCPCS | Performed by: FAMILY MEDICINE

## 2022-03-15 PROCEDURE — G8510 SCR DEP NEG, NO PLAN REQD: HCPCS | Performed by: FAMILY MEDICINE

## 2022-03-15 PROCEDURE — 99214 OFFICE O/P EST MOD 30 MIN: CPT | Performed by: FAMILY MEDICINE

## 2022-03-15 PROCEDURE — G8536 NO DOC ELDER MAL SCRN: HCPCS | Performed by: FAMILY MEDICINE

## 2022-03-15 PROCEDURE — 1101F PT FALLS ASSESS-DOCD LE1/YR: CPT | Performed by: FAMILY MEDICINE

## 2022-03-15 PROCEDURE — G8427 DOCREV CUR MEDS BY ELIG CLIN: HCPCS | Performed by: FAMILY MEDICINE

## 2022-03-15 PROCEDURE — G8417 CALC BMI ABV UP PARAM F/U: HCPCS | Performed by: FAMILY MEDICINE

## 2022-03-15 PROCEDURE — 99497 ADVNCD CARE PLAN 30 MIN: CPT | Performed by: FAMILY MEDICINE

## 2022-03-15 RX ORDER — TADALAFIL 10 MG/1
10 TABLET ORAL DAILY
Qty: 90 TABLET | Refills: 3 | Status: SHIPPED | OUTPATIENT
Start: 2022-03-15

## 2022-03-15 NOTE — PROGRESS NOTES
(AWV) The Medicare Annual Wellness Exam PROGRESS NOTE    This is a Medicare Annual Wellness Exam (AWV)    I have reviewed the patient's medical history in detail and updated the computerized patient record. Mansoor Bess is a 69 y.o.  male and presents for an annual wellness exam     ROS   General ROS: negative for - chills, fatigue or fever  Psychological ROS: negative for - anxiety or depression  Ophthalmic ROS: positive for - cataract; he wears glasses  ENT ROS: negative for - headaches, nasal congestion or sore throat  Endocrine ROS: negative for - polydipsia/polyuria or temperature intolerance  Respiratory ROS: no cough, shortness of breath, or wheezing  Cardiovascular ROS: no chest pain or dyspnea on exertion  Gastrointestinal ROS: no abdominal pain, change in bowel habits, or black or bloody stools  Genito-Urinary ROS: no dysuria, trouble voiding, or hematuria  Musculoskeletal ROS: negative for - muscle pain or muscular weakness; positive for - left knee pain  Neurological ROS: negative for - numbness/tingling or weakness  Dermatological ROS: negative for - rash or skin lesion changes    All other systems reviewed and are negative. 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 Raul Lin     Current Outpatient Medications   Medication Sig Dispense Refill    tadalafiL (CIALIS) 10 mg tablet Take 1 Tablet by mouth daily. Indications: the inability to have an erection 90 Tablet 3    ibuprofen (MOTRIN) 800 mg tablet Take 1 Tab by mouth every eight (8) hours as needed for Pain.  (Patient not taking: Reported on 3/15/2022) 30 Tab 1    multivitamin (ONE A DAY) tablet Take 1 Tab by mouth daily. (Patient not taking: Reported on 3/15/2022)      omega-3 fatty acids (FISH OIL) cap Take 1 Cap by mouth daily. (Patient not taking: Reported on 3/15/2022)       No Known Allergies  Family History   Problem Relation Age of Onset    Heart Disease Mother      Social History     Tobacco Use    Smoking status: Never Smoker    Smokeless tobacco: Never Used   Substance Use Topics    Alcohol use: No     Patient Active Problem List   Diagnosis Code    Erectile dysfunction N52.9    [facility] D47. Z2    Advance directive discussed with patient Z70.80       Health Maintenance History  Immunizations reviewed, dtap due , pneumovax up to date, flu up to date, zoster due  Colonoscopy: due,   Eye exam: up to date  covid up to date        Depression Risk Factor Screening:      Patient Health Questionnaire (PHQ-2)   Over the last 2 weeks, how often have you been bothered by any of the following problems? · Little interest or pleasure in doing things? · Not at all. [0]  · Feeling down, depressed, or hopeless? · Not at all. [0]    Total Score: 0/6  PHQ-2 Assessment Scoring:   A score of 2 or more requires further screening with the PHQ-9    Alcohol Risk Factor Screening:     Men: On any occasion during the past 3 months, have you had more than 4 drinks containing alcohol? no  Do you average more than 14 drinks per week? no    Functional Ability and Level of Safety:     Hearing Loss    Hearing is good. Activities of Daily Living   Self-care. Requires assistance with: no ADLs    Fall Risk   No fall risk factors    Abuse Screen   Patient is not abused    Examination   Physical Examination  Vitals:    03/15/22 0740 03/15/22 0743   BP: (!) 180/92 (!) 156/88   Pulse: 91    Resp: 17    Temp: 98.2 °F (36.8 °C)    TempSrc: Temporal    SpO2: 96%    Weight: 250 lb (113.4 kg)    Height: 6' (1.829 m)       Body mass index is 33.91 kg/m².      Evaluation of Cognitive Function:  Mood/affect: good mood with appropriate affect  Appearance: well kempt  Family member/caregiver input: n/a    alert, well appearing, and in no distress, oriented to person, place, and time and obese    Patient Care Team:  Miguel Angel Angel MD as PCP - General (Family Medicine)  Miguel Angel Angel MD as PCP - [facility] Empaneled Provider  Morena Benitez MD (Urology)    End-of-life planning  Advanced Directive in the case than an injury or illness causes the patient to be unable to make health care decisions    Health Care Directive or Living Will: yes    Advice/Referrals/Counselling/Plan:   Education and counseling provided:  End-of-Life planning (with patient's consent)  Prostate cancer screening tests (PSA, covered annually)  Colorectal cancer screening tests  Cardiovascular screening blood test  Diabetes screening test  covid vaccine  Include in education list (weight loss, physical activity, smoking cessation, fall prevention, and nutrition)    ICD-10-CM ICD-9-CM    1. 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 Z2 785.6    3. Erectile dysfunction due to arterial insufficiency  N52.01 607.84 tadalafiL (CIALIS) 10 mg tablet      PSA, DIAGNOSTIC (PROSTATE SPECIFIC AG)   4. Hypercholesteremia  E78.00 272.0 LIPID PANEL   5. Advance care planning  Z71.89 V65.49 ADVANCE CARE PLANNING FIRST 30 MINS   6. Elevated blood pressure reading  F55.1 663.0 METABOLIC PANEL, COMPREHENSIVE   7. Prostate cancer screening  Z12.5 V76.44 PSA, DIAGNOSTIC (PROSTATE SPECIFIC AG)   8. Elevated PSA  R97.20 790.93 PSA, DIAGNOSTIC (PROSTATE SPECIFIC AG)   . Brief written plan, checklist    I have discussed the diagnosis with the patient and the intended plan as seen in the above orders. The patient has received an after-visit summary and questions were answered concerning future plans. I have discussed medication side effects and warnings with the patient as well. I have reviewed the plan of care with the patient, accepted their input and they are in agreement with the treatment goals. ____________________________________________________________    Problem Assessment    for treatment of   Chief Complaint   Patient presents with    Annual Wellness Visit    Medication Refill         SUBJECTIVE    Well Adult Physical   Patient here for a comprehensive physical exam.The patient reports problems - pt in right lower quadrant of the abdomen X 6 weeks; he is s/p laparoscopic appendectomy; he denies bulge. No constipation  Do you take any herbs or supplements that were not prescribed by a doctor? no Are you taking calcium supplements? no Are you taking aspirin daily? not applicable    Cardiovascular Review:  The patient has hyperlipidemia and obesity. Diet and Lifestyle: not attempting to follow a low fat, low cholesterol diet, not attempting to follow a low sodium diet, does not rigorously follow a diabetic diet, exercises sporadically, nonsmoker  Home BP Monitoring: is not measured at home. Pertinent ROS: taking medications as instructed, no medication side effects noted, no TIA's, no chest pain on exertion, no dyspnea on exertion, no swelling of ankles. Visit Vitals  BP (!) 142/78 (BP 1 Location: Left arm, BP Patient Position: Sitting)   Pulse 91   Temp 98.2 °F (36.8 °C) (Temporal)   Resp 17   Ht 6' (1.829 m)   Wt 250 lb (113.4 kg)   SpO2 96%   BMI 33.91 kg/m²     General:  Alert, cooperative, no distress, appears stated age. Head:  Normocephalic, without obvious abnormality, atraumatic. Eyes:  Conjunctivae/corneas clear. PERRL, EOMs intact. Ears:  Normal TMs and external ear canals both ears. Nose: Nares normal. Septum midline. Mucosa normal. No drainage or sinus tenderness.    Throat: Lips, mucosa, and tongue normal. Teeth and gums normal.   Neck: Supple, symmetrical, trachea midline, no adenopathy, thyroid: no enlargement/tenderness/nodules and no JVD.   Back:   Symmetric, no curvature. ROM normal. No CVA tenderness. Lungs:   Clear to auscultation bilaterally. Chest wall:  No tenderness or deformity. Heart:  Regular rate and rhythm, S1, S2 normal, no murmur, click, rub or gallop. Abdomen:   Soft, non-tender. Bowel sounds normal. No masses,  No organomegaly. Genitalia:  deferred   Rectal:  deferred   Extremities: Extremities normal, atraumatic, no cyanosis or edema. Pulses: 2+ and symmetric all extremities. Skin: Skin color, texture, turgor normal. No rashes or lesions   Lymph nodes: Cervical, supraclavicular, and axillary nodes normal.   Neurologic: CNII-XII intact. Normal strength, sensation and reflexes throughout. LABS     TESTS      Assessment/Plan:    1. 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 Medicare annual wellness visit, subsequent  Reviewed preventive recommendations  - [address] [place] [address] [employer]    3. Erectile dysfunction due to arterial insufficiency  Change medication due to cost; psa ordered  - tadalafiL (CIALIS) 10 mg tablet; Take 1 Tablet by mouth daily. Indications: the inability to have an erection  Dispense: 90 Tablet; Refill: 3  - PSA, DIAGNOSTIC (PROSTATE SPECIFIC AG); Future    4. Hypercholesteremia  Encourage low cholesterol diet  - LIPID PANEL; Future    5. Advance care planning  See ACP  - ADVANCE CARE PLANNING FIRST 30 MINS    6. Elevated blood pressure reading  Assess renal function; restart exercise routine  - METABOLIC PANEL, COMPREHENSIVE; Future    7. Prostate cancer screening    - PSA, DIAGNOSTIC (PROSTATE SPECIFIC AG); Future    8. Elevated PSA    - PSA, DIAGNOSTIC (PROSTATE SPECIFIC AG);  Future    Lab review: orders written for new lab studies as appropriate; see orders

## 2022-03-15 NOTE — ACP (ADVANCE CARE PLANNING)
Advance Care Planning     Advance Care Planning (ACP) Physician/NP/PA Conversation      Date of Conversation: 3/15/2022  Conducted with: Patient with [address] Decision Maker:     Primary Decision Maker: Milli Lucia - [phone]    Secondary Decision Maker: Raissa Dickey - Daughter  Click here to complete [address] including selection of the Healthcare Decision Maker Relationship (ie \"Primary\")      Today we documented Decision Maker(s) consistent with Legal Next of Kin hierarchy. Care Preferences:    Hospitalization: \"If your health worsens and it becomes clear that your chance of recovery is unlikely, what would be your preference regarding hospitalization? \"  The patient would prefer hospitalization. Ventilation: \"If you were unable to breathe on your own and your chance of recovery was unlikely, what would be your preference about the use of a ventilator (breathing machine) if it was available to you? \"   The patient would desire the use of a ventilator. Resuscitation: \"In the event your heart stopped as a result of an underlying serious health condition, would you want attempts to be made to restart your heart, or would you prefer a natural death? \"   Yes, attempt to resuscitate.     Additional topics discussed: treatment goals, benefit/burden of treatment options, ventilation preferences, hospitalization preferences and resuscitation preferences    Conversation Outcomes / Follow-Up Plan:   ACP complete - no further action today  Reviewed DNR/DNI and patient elects Full Code (Attempt Resuscitation)     Length of Voluntary ACP Conversation in minutes:  16 minutes    Ifeanyi Rebolledo MD

## 2022-03-15 NOTE — PROGRESS NOTES
Danilo Hamilton is a 69 y.o. male (: 1945) presenting to address:    Chief Complaint   Patient presents with    Physical    Medication Refill       There were no vitals filed for this visit. Hearing/Vision:   No exam data present    Learning Assessment:     Learning Assessment 2017   PRIMARY LEARNER Patient   HIGHEST LEVEL OF EDUCATION - PRIMARY LEARNER  -   BARRIERS PRIMARY LEARNER -   CO-LEARNER CAREGIVER -   PRIMARY LANGUAGE ENGLISH   LEARNER PREFERENCE PRIMARY READING   ANSWERED BY patient   RELATIONSHIP SELF     Depression Screening:     3 most recent PHQ Screens 3/15/2022   PHQ Not Done -   Little interest or pleasure in doing things Not at all   Feeling down, depressed, irritable, or hopeless Not at all   Total Score PHQ 2 0     Fall Risk Assessment:     Fall Risk Assessment, last 12 mths 3/15/2022   Able to walk? Yes   Fall in past 12 months? 0   Do you feel unsteady? 0   Are you worried about falling 0     Abuse Screening:     Abuse Screening Questionnaire 2020   Do you ever feel afraid of your partner? N   Are you in a relationship with someone who physically or mentally threatens you? N   Is it safe for you to go home? Y     ADL Assessment:     ADL Assessment 2019   Feeding yourself No Help Needed   Getting from bed to chair No Help Needed   Getting dressed No Help Needed   Bathing or showering No Help Needed   Walk across the room (includes cane/walker) No Help Needed   Using the telphone No Help Needed   Taking your medications No Help Needed   Preparing meals No Help Needed   Managing money (expenses/bills) No Help Needed   Moderately strenuous housework (laundry) No Help Needed   Shopping for personal items (toiletries/medicines) No Help Needed   Shopping for groceries No Help Needed   Driving No Help Needed   Climbing a flight of stairs No Help Needed   Getting to places beyond walking distances No Help Needed        Coordination of Care Questionaire:   1.  Have you been to the ER, urgent care clinic since your last visit? Hospitalized since your last visit? NO    2. Have you seen or consulted any other health care providers outside of the [address] [place] [address] since your last visit? Include any pap smears or colon screening.  NO

## 2022-03-16 LAB
ALBUMIN SERPL-MCNC: 4.3 G/DL (ref 3.7–4.7)
ALBUMIN/GLOB SERPL: 1.3 {RATIO} (ref 1.2–2.2)
ALP SERPL-CCNC: 56 IU/L (ref 44–121)
ALT SERPL-CCNC: 12 IU/L (ref 0–44)
AST SERPL-CCNC: 16 IU/L (ref 0–40)
BILIRUB SERPL-MCNC: 0.3 MG/DL (ref 0–1.2)
BUN SERPL-MCNC: 18 MG/DL (ref 8–27)
BUN/CREAT SERPL: 17 (ref 10–24)
CALCIUM SERPL-MCNC: 9.3 MG/DL (ref 8.6–10.2)
CHLORIDE SERPL-SCNC: 104 MMOL/L (ref 96–106)
CHOLEST SERPL-MCNC: 192 MG/DL (ref 100–199)
CO2 SERPL-SCNC: 21 MMOL/L (ref 20–29)
CREAT SERPL-MCNC: 1.09 MG/DL (ref 0.76–1.27)
EGFR: 70 ML/MIN/1.73
GLOBULIN SER CALC-MCNC: 3.2 G/DL (ref 1.5–4.5)
GLUCOSE SERPL-MCNC: 96 MG/DL (ref 65–99)
HDLC SERPL-MCNC: 48 MG/DL
IMP & REVIEW OF LAB RESULTS: NORMAL
INTERPRETATION: NORMAL
LDLC SERPL CALC-MCNC: 131 MG/DL (ref 0–99)
POTASSIUM SERPL-SCNC: 4.4 MMOL/L (ref 3.5–5.2)
PROT SERPL-MCNC: 7.5 G/DL (ref 6–8.5)
PSA SERPL-MCNC: 9.4 NG/ML (ref 0–4)
SODIUM SERPL-SCNC: 141 MMOL/L (ref 134–144)
TRIGL SERPL-MCNC: 72 MG/DL (ref 0–149)
VLDLC SERPL CALC-MCNC: 13 MG/DL (ref 5–40)

## 2022-03-30 ENCOUNTER — PATIENT MESSAGE (OUTPATIENT)
Dept: FAMILY MEDICINE CLINIC | Age: 77
End: 2022-03-30

## 2022-03-30 DIAGNOSIS — R97.20 ELEVATED PSA: Primary | ICD-10-CM

## 2022-10-06 ENCOUNTER — OFFICE VISIT (OUTPATIENT)
Dept: FAMILY MEDICINE CLINIC | Age: 77
End: 2022-10-06
Payer: MEDICARE

## 2022-10-06 VITALS
OXYGEN SATURATION: 96 % | SYSTOLIC BLOOD PRESSURE: 138 MMHG | RESPIRATION RATE: 17 BRPM | HEART RATE: 81 BPM | HEIGHT: 72 IN | WEIGHT: 240 LBS | DIASTOLIC BLOOD PRESSURE: 79 MMHG | TEMPERATURE: 97.8 F | BODY MASS INDEX: 32.51 KG/M2

## 2022-10-06 DIAGNOSIS — Z01.818 PRE-OP EXAM: Primary | ICD-10-CM

## 2022-10-06 DIAGNOSIS — D47.Z2 CASTLEMAN DISEASE (HCC): ICD-10-CM

## 2022-10-06 DIAGNOSIS — E78.00 HYPERCHOLESTEREMIA: ICD-10-CM

## 2022-10-06 DIAGNOSIS — N28.89 RIGHT RENAL MASS: ICD-10-CM

## 2022-10-06 PROCEDURE — G8427 DOCREV CUR MEDS BY ELIG CLIN: HCPCS | Performed by: FAMILY MEDICINE

## 2022-10-06 PROCEDURE — G8536 NO DOC ELDER MAL SCRN: HCPCS | Performed by: FAMILY MEDICINE

## 2022-10-06 PROCEDURE — 99214 OFFICE O/P EST MOD 30 MIN: CPT | Performed by: FAMILY MEDICINE

## 2022-10-06 PROCEDURE — 1101F PT FALLS ASSESS-DOCD LE1/YR: CPT | Performed by: FAMILY MEDICINE

## 2022-10-06 PROCEDURE — 1123F ACP DISCUSS/DSCN MKR DOCD: CPT | Performed by: FAMILY MEDICINE

## 2022-10-06 PROCEDURE — G8510 SCR DEP NEG, NO PLAN REQD: HCPCS | Performed by: FAMILY MEDICINE

## 2022-10-06 PROCEDURE — G8417 CALC BMI ABV UP PARAM F/U: HCPCS | Performed by: FAMILY MEDICINE

## 2022-10-06 NOTE — PROGRESS NOTES
Missael Andrews is a 69 y.o. male (: 1945) presenting to address:    Chief Complaint   Patient presents with    Medication Refill    Pre-op Exam       There were no vitals filed for this visit. Hearing/Vision:   No results found. Learning Assessment:     Learning Assessment 2017   PRIMARY LEARNER Patient   HIGHEST LEVEL OF EDUCATION - PRIMARY LEARNER  -   BARRIERS PRIMARY LEARNER -   CO-LEARNER CAREGIVER -   PRIMARY LANGUAGE ENGLISH   LEARNER PREFERENCE PRIMARY READING   ANSWERED BY patient   RELATIONSHIP SELF     Depression Screening:     3 most recent PHQ Screens 10/6/2022   PHQ Not Done -   Little interest or pleasure in doing things Not at all   Feeling down, depressed, irritable, or hopeless Not at all   Total Score PHQ 2 0     Fall Risk Assessment:     Fall Risk Assessment, last 12 mths 3/15/2022   Able to walk? Yes   Fall in past 12 months? 0   Do you feel unsteady? 0   Are you worried about falling 0     Abuse Screening:     Abuse Screening Questionnaire 2020   Do you ever feel afraid of your partner? N   Are you in a relationship with someone who physically or mentally threatens you? N   Is it safe for you to go home? Y     ADL Assessment:     ADL Assessment 2019   Feeding yourself No Help Needed   Getting from bed to chair No Help Needed   Getting dressed No Help Needed   Bathing or showering No Help Needed   Walk across the room (includes cane/walker) No Help Needed   Using the telphone No Help Needed   Taking your medications No Help Needed   Preparing meals No Help Needed   Managing money (expenses/bills) No Help Needed   Moderately strenuous housework (laundry) No Help Needed   Shopping for personal items (toiletries/medicines) No Help Needed   Shopping for groceries No Help Needed   Driving No Help Needed   Climbing a flight of stairs No Help Needed   Getting to places beyond walking distances No Help Needed        Coordination of Care Questionaire:     1.  \"Have you been to the ER, urgent care clinic since your last visit? Hospitalized since your last visit? \" No    2. \"Have you seen or consulted any other health care providers outside of the [address] [place] [address] since your last visit? \" No     3. For patients aged 39-70: Has the patient had a colonoscopy / FIT/ Cologuard? Yes - no Care Gap present      If the patient is female:    4. For patients aged 41-77: Has the patient had a mammogram within the past 2 years? NA - based on age or sex      11. For patients aged 21-65: Has the patient had a pap smear?  NA - based on age or sex

## 2022-10-06 NOTE — PROGRESS NOTES
Ginette Gustafson is a 69 y.o.  male and presents with    Chief Complaint   Patient presents with    Medication Refill    Pre-op Exam     Subjective:  Pre op Physical   Patient here for a pre op physical exam.The patient reports problems - renal mass   Do you take any herbs or supplements that were not prescribed by a doctor? no Are you taking calcium supplements? no Are you taking aspirin daily? no  Surgery with Dr. Bryce Ramirez for mass excision  Date 10/19/2022  Cardiovascular Review:  The patient has hyperlipidemia and obesity. Diet and Lifestyle: not attempting to follow a low fat, low cholesterol diet, not attempting to follow a low sodium diet, does not rigorously follow a diabetic diet, exercises sporadically, nonsmoker  Home BP Monitoring: is not measured at home. Pertinent ROS: taking medications as instructed, no medication side effects noted, no TIA's, no chest pain on exertion, no dyspnea on exertion, no swelling of ankles. ROS   General ROS: negative for - chills, fatigue or fever  Psychological ROS: negative for - anxiety or depression  Ophthalmic ROS: positive for - cataract; he wears glasses  ENT ROS: negative for - headaches, nasal congestion or sore throat  Endocrine ROS: negative for - polydipsia/polyuria or temperature intolerance  Respiratory ROS: no cough, shortness of breath, or wheezing  Cardiovascular ROS: no chest pain or dyspnea on exertion  Gastrointestinal ROS: no abdominal pain, change in bowel habits, or black or bloody stools  Genito-Urinary ROS: no dysuria, trouble voiding, or hematuria  Musculoskeletal ROS: negative for - muscle pain or muscular weakness; positive for - left knee pain  Neurological ROS: negative for - numbness/tingling or weakness  Dermatological ROS: negative for - rash or skin lesion changes    All other systems reviewed and are negative.       Objective:  Vitals:    10/06/22 0920   BP: 138/79   Pulse: 81   Resp: 17   Temp: 97.8 °F (36.6 °C) TempSrc: Temporal   SpO2: 96%   Weight: 240 lb (108.9 kg)   Height: 6' (1.829 m)       alert, well appearing, and in no distress, oriented to person, place, and time, and obese  Mental status - alert, oriented to person, place, and time, normal mood, behavior, speech, dress, motor activity, and thought processes, obese  Chest - clear to auscultation, no wheezes, rales or rhonchi, symmetric air entry  Heart - normal rate, regular rhythm, normal S1, S2, no murmurs, rubs, clicks or gallops  Neurological - cranial nerves II through XII intact    LABS     TESTS      Assessment/Plan:    1. Pre-op exam  Labs and studies reviewed; cleared for surgery    2. Right renal mass  Surgery scheduled with Dr. Asif Robb    3. [facility]      4. Hypercholesteremia  Encourage healthy lifestyle      Lab review: orders written for new lab studies as appropriate; see orders      I have discussed the diagnosis with the patient and the intended plan as seen in the above orders. The patient has received an after-visit summary and questions were answered concerning future plans. I have discussed medication side effects and warnings with the patient as well. I have reviewed the plan of care with the patient, accepted their input and they are in agreement with the treatment goals.

## 2023-03-21 ENCOUNTER — OFFICE VISIT (OUTPATIENT)
Facility: CLINIC | Age: 78
End: 2023-03-21
Payer: COMMERCIAL

## 2023-03-21 VITALS
DIASTOLIC BLOOD PRESSURE: 84 MMHG | OXYGEN SATURATION: 98 % | HEART RATE: 83 BPM | SYSTOLIC BLOOD PRESSURE: 125 MMHG | TEMPERATURE: 98.2 F | RESPIRATION RATE: 16 BRPM | HEIGHT: 72 IN | WEIGHT: 237 LBS | BODY MASS INDEX: 32.1 KG/M2

## 2023-03-21 DIAGNOSIS — Z71.89 ADVANCE CARE PLANNING: ICD-10-CM

## 2023-03-21 DIAGNOSIS — C61 PROSTATE CANCER (HCC): ICD-10-CM

## 2023-03-21 DIAGNOSIS — Z00.00 MEDICARE ANNUAL WELLNESS VISIT, SUBSEQUENT: Primary | ICD-10-CM

## 2023-03-21 DIAGNOSIS — D47.Z2 CASTLEMAN DISEASE (HCC): ICD-10-CM

## 2023-03-21 DIAGNOSIS — N52.01 ERECTILE DYSFUNCTION DUE TO ARTERIAL INSUFFICIENCY: ICD-10-CM

## 2023-03-21 DIAGNOSIS — E78.00 HYPERCHOLESTEROLEMIA: ICD-10-CM

## 2023-03-21 PROCEDURE — 1123F ACP DISCUSS/DSCN MKR DOCD: CPT | Performed by: FAMILY MEDICINE

## 2023-03-21 PROCEDURE — G0439 PPPS, SUBSEQ VISIT: HCPCS | Performed by: FAMILY MEDICINE

## 2023-03-21 PROCEDURE — 99214 OFFICE O/P EST MOD 30 MIN: CPT | Performed by: FAMILY MEDICINE

## 2023-03-21 RX ORDER — TADALAFIL 10 MG/1
10 TABLET ORAL DAILY
Qty: 30 TABLET | Refills: 1 | Status: SHIPPED | OUTPATIENT
Start: 2023-03-21

## 2023-03-21 SDOH — ECONOMIC STABILITY: INCOME INSECURITY: HOW HARD IS IT FOR YOU TO PAY FOR THE VERY BASICS LIKE FOOD, HOUSING, MEDICAL CARE, AND HEATING?: NOT HARD AT ALL

## 2023-03-21 SDOH — ECONOMIC STABILITY: FOOD INSECURITY: WITHIN THE PAST 12 MONTHS, YOU WORRIED THAT YOUR FOOD WOULD RUN OUT BEFORE YOU GOT MONEY TO BUY MORE.: NEVER TRUE

## 2023-03-21 SDOH — ECONOMIC STABILITY: HOUSING INSECURITY
IN THE LAST 12 MONTHS, WAS THERE A TIME WHEN YOU DID NOT HAVE A STEADY PLACE TO SLEEP OR SLEPT IN A SHELTER (INCLUDING NOW)?: NO

## 2023-03-21 SDOH — ECONOMIC STABILITY: FOOD INSECURITY: WITHIN THE PAST 12 MONTHS, THE FOOD YOU BOUGHT JUST DIDN'T LAST AND YOU DIDN'T HAVE MONEY TO GET MORE.: NEVER TRUE

## 2023-03-21 ASSESSMENT — ENCOUNTER SYMPTOMS
SORE THROAT: 0
TROUBLE SWALLOWING: 0
CONSTIPATION: 0
BLOOD IN STOOL: 0
VOMITING: 0
NAUSEA: 0
SINUS PAIN: 0
SINUS PRESSURE: 0
DIARRHEA: 0
EYE PAIN: 0

## 2023-03-21 ASSESSMENT — PATIENT HEALTH QUESTIONNAIRE - PHQ9
SUM OF ALL RESPONSES TO PHQ9 QUESTIONS 1 & 2: 0
SUM OF ALL RESPONSES TO PHQ QUESTIONS 1-9: 0
1. LITTLE INTEREST OR PLEASURE IN DOING THINGS: 0
2. FEELING DOWN, DEPRESSED OR HOPELESS: 0
SUM OF ALL RESPONSES TO PHQ QUESTIONS 1-9: 0

## 2023-03-21 ASSESSMENT — LIFESTYLE VARIABLES
HOW OFTEN DO YOU HAVE A DRINK CONTAINING ALCOHOL: NEVER
HOW MANY STANDARD DRINKS CONTAINING ALCOHOL DO YOU HAVE ON A TYPICAL DAY: PATIENT DOES NOT DRINK
HOW OFTEN DO YOU HAVE A DRINK CONTAINING ALCOHOL: NEVER

## 2023-03-21 ASSESSMENT — VISUAL ACUITY
OS_CC: 20/20
OD_CC: 20/20

## 2023-03-21 NOTE — PROGRESS NOTES
Norbert Lindo presents today for   Chief Complaint   Patient presents with    Medicare AWV       Is someone accompanying this pt? No    Is the patient using any DME equipment during OV? No    Depression Screening:  No flowsheet data found. Learning Assessment:  No flowsheet data found. Abuse Screening:  No flowsheet data found. Fall Risk  No flowsheet data found. Health Maintenance reviewed and discussed and ordered per Provider. Health Maintenance Due   Topic Date Due    DTaP/Tdap/Td vaccine (3 - Td or Tdap) 02/20/2017    Shingles vaccine (3 of 3) 10/08/2019    COVID-19 Vaccine (5 - Booster) 11/02/2022    Annual Wellness Visit (AWV)  03/16/2023   . 1. \"Have you been to the ER, urgent care clinic since your last visit? Hospitalized since your last visit? \" No    2. \"Have you seen or consulted any other health care providers outside of the [address] [place] [address] since your last visit? \" No    3. For patients over 45: Has the patient had a colonoscopy? Yes - no Care Gap present     If the patient is female:    4. For patients over 36: Has the patient had a mammogram? NA - based on age or sex    11. For patients over 21: Has the patient had a pap smear?  No
Reconciliation       CareTeam (Including outside providers/suppliers regularly involved in providing care):   Patient Care Team:  Vidhya Lucas MD as PCP - Rene Escamilla MD as PCP - Empaneled Provider     Reviewed and updated this visit:  Tobacco  Allergies  Meds  Problems  Med Hx  Surg Hx  Soc Hx  Fam Hx               Vidhya Lucas MD

## 2023-03-21 NOTE — PATIENT INSTRUCTIONS
lifestyle, illnesses that may run in your family, and various assessments and screenings as appropriate. After reviewing your medical record and screening and assessments performed today your provider may have ordered immunizations, labs, imaging, and/or referrals for you. A list of these orders (if applicable) as well as your Preventive Care list are included within your After Visit Summary for your review. Other Preventive Recommendations:    A preventive eye exam performed by an eye specialist is recommended every 1-2 years to screen for glaucoma; cataracts, macular degeneration, and other eye disorders. A preventive dental visit is recommended every 6 months. Try to get at least 150 minutes of exercise per week or 10,000 steps per day on a pedometer . Order or download the FREE \"Exercise & Physical Activity: Your Everyday Guide\" from [employer]. Call 4-393.144.3851 or search [employer] online. You need 0025-5714 mg of calcium and 1070-4592 IU of vitamin D per day. It is possible to meet your calcium requirement with diet alone, but a vitamin D supplement is usually necessary to meet this goal.  When exposed to the sun, use a sunscreen that protects against both UVA and UVB radiation with an SPF of 30 or greater. Reapply every 2 to 3 hours or after sweating, drying off with a towel, or swimming. Always wear a seat belt when traveling in a car. Always wear a helmet when riding a bicycle or motorcycle.

## 2023-03-22 LAB — SPECIMEN STATUS REPORT: NORMAL

## 2023-03-23 LAB
ALBUMIN SERPL-MCNC: 4.6 G/DL (ref 3.7–4.7)
ALBUMIN/GLOB SERPL: 1.2 {RATIO} (ref 1.2–2.2)
ALP SERPL-CCNC: 57 IU/L (ref 44–121)
ALT SERPL-CCNC: 10 IU/L (ref 0–44)
AST SERPL-CCNC: 14 IU/L (ref 0–40)
BILIRUB SERPL-MCNC: 0.3 MG/DL (ref 0–1.2)
BUN SERPL-MCNC: 19 MG/DL (ref 8–27)
BUN/CREAT SERPL: 15 (ref 10–24)
CALCIUM SERPL-MCNC: 9.7 MG/DL (ref 8.6–10.2)
CHLORIDE SERPL-SCNC: 106 MMOL/L (ref 96–106)
CHOLEST SERPL-MCNC: 181 MG/DL (ref 100–199)
CO2 SERPL-SCNC: 23 MMOL/L (ref 20–29)
CREAT SERPL-MCNC: 1.31 MG/DL (ref 0.76–1.27)
EGFRCR SERPLBLD CKD-EPI 2021: 56 ML/MIN/1.73
GLOBULIN SER CALC-MCNC: 3.8 G/DL (ref 1.5–4.5)
GLUCOSE SERPL-MCNC: 100 MG/DL (ref 70–99)
HDLC SERPL-MCNC: 52 MG/DL
LDLC SERPL CALC-MCNC: 116 MG/DL (ref 0–99)
POTASSIUM SERPL-SCNC: 4.6 MMOL/L (ref 3.5–5.2)
PROT SERPL-MCNC: 8.4 G/DL (ref 6–8.5)
SODIUM SERPL-SCNC: 144 MMOL/L (ref 134–144)
TRIGL SERPL-MCNC: 70 MG/DL (ref 0–149)
VLDLC SERPL CALC-MCNC: 13 MG/DL (ref 5–40)

## 2023-10-04 ENCOUNTER — TELEMEDICINE (OUTPATIENT)
Facility: CLINIC | Age: 78
End: 2023-10-04
Payer: MEDICARE

## 2023-10-04 DIAGNOSIS — R33.9 URINARY RETENTION: Primary | ICD-10-CM

## 2023-10-04 DIAGNOSIS — N18.9 ACUTE KIDNEY INJURY SUPERIMPOSED ON CHRONIC KIDNEY DISEASE (HCC): ICD-10-CM

## 2023-10-04 DIAGNOSIS — N17.9 ACUTE KIDNEY INJURY SUPERIMPOSED ON CHRONIC KIDNEY DISEASE (HCC): ICD-10-CM

## 2023-10-04 DIAGNOSIS — Z85.528 HISTORY OF RENAL CELL CANCER: ICD-10-CM

## 2023-10-04 PROCEDURE — 99443 PR PHYS/QHP TELEPHONE EVALUATION 21-30 MIN: CPT | Performed by: FAMILY MEDICINE

## 2023-10-04 NOTE — PROGRESS NOTES
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 Pt recently had postop mcmillan removed and has had acute urinary retention. Pt also endorsed 1 week hx of RLE edema w/o pain or erythema upon arrival at ER. Denies CP, SOB, palpitations. AT ER, imaging c/w sig urinary retention/bladder obstruction. Found to have MYRIAM. Also RLE PVL acute DVT and started on hep gtt. Kidney function improved at home. He reports feeling better. Review of Systems    Prior to Visit Medications    Medication Sig Taking? Authorizing Provider   tamsulosin (FLOMAX) 0.4 MG capsule Take 1 capsule by mouth daily  Given, Miguel Angel Macias MD   Leuprolide Acetate, 3 Month, (ELIGARD) 22.5 MG KIT Inject 22.5 mg into the skin once for 1 dose  Abi Foy PA-C   degarelix acetate (FIRMAGON) 80 MG SOLR chemo injection Inject 4 mLs into the skin once for 1 dose  Abi Foy PA-C   vitamin D (ERGOCALCIFEROL) 1.25 MG (29472 UT) CAPS capsule Take 1 capsule by mouth once a week  bAi Foy PA-C   Cyanocobalamin (VITAMIN B 12 PO) Take by mouth  Provider, MD Jessica   Calcium Carbonate-Vitamin D 600-3. 125 MG-MCG TABS Take by mouth  Automatic Reconciliation, Ar       Social History     Tobacco Use    Smoking status: Never    Smokeless tobacco: Never   Substance Use Topics    Alcohol use: No    Drug use:  No            PHYSICAL EXAMINATION:  [ INSTRUCTIONS:  \"[x]\" Indicates a positive item  \"[]\" Indicates a negative item  -- DELETE ALL ITEMS NOT EXAMINED]  Vital Signs: (As obtained by patient/caregiver or practitioner observation)    Blood pressure-  Heart rate-    Respiratory rate-    Temperature-  Pulse oximetry-     Constitutional: [] Appears well-developed and

## 2024-03-22 ENCOUNTER — OFFICE VISIT (OUTPATIENT)
Facility: CLINIC | Age: 79
End: 2024-03-22

## 2024-03-22 VITALS
WEIGHT: 243.8 LBS | HEIGHT: 72 IN | TEMPERATURE: 98 F | HEART RATE: 89 BPM | RESPIRATION RATE: 17 BRPM | SYSTOLIC BLOOD PRESSURE: 138 MMHG | DIASTOLIC BLOOD PRESSURE: 76 MMHG | OXYGEN SATURATION: 95 % | BODY MASS INDEX: 33.02 KG/M2

## 2024-03-22 DIAGNOSIS — N30.01 ACUTE CYSTITIS WITH HEMATURIA: Primary | ICD-10-CM

## 2024-03-22 DIAGNOSIS — Z97.8 INDWELLING FOLEY CATHETER PRESENT: ICD-10-CM

## 2024-03-22 DIAGNOSIS — Z00.00 MEDICARE ANNUAL WELLNESS VISIT, SUBSEQUENT: ICD-10-CM

## 2024-03-22 DIAGNOSIS — Z71.89 ADVANCE CARE PLANNING: ICD-10-CM

## 2024-03-22 DIAGNOSIS — E78.00 HYPERCHOLESTEROLEMIA: ICD-10-CM

## 2024-03-22 DIAGNOSIS — C61 PROSTATE CANCER (HCC): ICD-10-CM

## 2024-03-22 LAB
BILIRUBIN, URINE, POC: NEGATIVE
BLOOD URINE, POC: NORMAL
GLUCOSE URINE, POC: NEGATIVE
KETONES, URINE, POC: NEGATIVE
LEUKOCYTE ESTERASE, URINE, POC: NORMAL
NITRITE, URINE, POC: POSITIVE
PH, URINE, POC: 5.5 (ref 4.6–8)
PROTEIN,URINE, POC: NORMAL
SPECIFIC GRAVITY, URINE, POC: 1.03 (ref 1–1.03)
URINALYSIS CLARITY, POC: NORMAL
URINALYSIS COLOR, POC: YELLOW
UROBILINOGEN, POC: NORMAL

## 2024-03-22 RX ORDER — CEPHALEXIN 500 MG/1
500 CAPSULE ORAL 2 TIMES DAILY
Qty: 14 CAPSULE | Refills: 0 | Status: SHIPPED | OUTPATIENT
Start: 2024-03-22 | End: 2024-03-29

## 2024-03-22 SDOH — ECONOMIC STABILITY: FOOD INSECURITY: WITHIN THE PAST 12 MONTHS, THE FOOD YOU BOUGHT JUST DIDN'T LAST AND YOU DIDN'T HAVE MONEY TO GET MORE.: NEVER TRUE

## 2024-03-22 SDOH — ECONOMIC STABILITY: FOOD INSECURITY: WITHIN THE PAST 12 MONTHS, YOU WORRIED THAT YOUR FOOD WOULD RUN OUT BEFORE YOU GOT MONEY TO BUY MORE.: NEVER TRUE

## 2024-03-22 SDOH — ECONOMIC STABILITY: INCOME INSECURITY: HOW HARD IS IT FOR YOU TO PAY FOR THE VERY BASICS LIKE FOOD, HOUSING, MEDICAL CARE, AND HEATING?: NOT HARD AT ALL

## 2024-03-22 ASSESSMENT — ANXIETY QUESTIONNAIRES
7. FEELING AFRAID AS IF SOMETHING AWFUL MIGHT HAPPEN: NOT AT ALL
4. TROUBLE RELAXING: NOT AT ALL
IF YOU CHECKED OFF ANY PROBLEMS ON THIS QUESTIONNAIRE, HOW DIFFICULT HAVE THESE PROBLEMS MADE IT FOR YOU TO DO YOUR WORK, TAKE CARE OF THINGS AT HOME, OR GET ALONG WITH OTHER PEOPLE: NOT DIFFICULT AT ALL
3. WORRYING TOO MUCH ABOUT DIFFERENT THINGS: NOT AT ALL
6. BECOMING EASILY ANNOYED OR IRRITABLE: NOT AT ALL
5. BEING SO RESTLESS THAT IT IS HARD TO SIT STILL: NOT AT ALL
GAD7 TOTAL SCORE: 0
2. NOT BEING ABLE TO STOP OR CONTROL WORRYING: NOT AT ALL
1. FEELING NERVOUS, ANXIOUS, OR ON EDGE: NOT AT ALL

## 2024-03-22 ASSESSMENT — PATIENT HEALTH QUESTIONNAIRE - PHQ9
1. LITTLE INTEREST OR PLEASURE IN DOING THINGS: NOT AT ALL
SUM OF ALL RESPONSES TO PHQ9 QUESTIONS 1 & 2: 0
2. FEELING DOWN, DEPRESSED OR HOPELESS: NOT AT ALL
SUM OF ALL RESPONSES TO PHQ QUESTIONS 1-9: 0

## 2024-03-22 ASSESSMENT — LIFESTYLE VARIABLES
HOW OFTEN DO YOU HAVE A DRINK CONTAINING ALCOHOL: NEVER
HOW MANY STANDARD DRINKS CONTAINING ALCOHOL DO YOU HAVE ON A TYPICAL DAY: PATIENT DOES NOT DRINK

## 2024-03-22 NOTE — PROGRESS NOTES
Roger Wiggins is a 79 y.o. presents today for   Chief Complaint   Patient presents with    Medicare AWV     Is someone accompanying this pt? no    Is the patient using any DME equipment during OV? no  There were no vitals filed for this visit.    Depression Screening:       3/22/2024     9:25 AM 3/21/2023     9:20 AM 10/6/2022     9:18 AM 3/15/2022     7:37 AM 3/30/2021     8:35 AM   PHQ-9 Questionaire   Little interest or pleasure in doing things 0 0 0 0 0   Feeling down, depressed, or hopeless 0 0 0 0 0   PHQ-9 Total Score 0 0 0 0 0        Abuse Screening:      3/22/2024     9:00 AM   AMB Abuse Screening   Do you ever feel afraid of your partner? N   Are you in a relationship with someone who physically or mentally threatens you? N   Is it safe for you to go home? Y        Learning Assessment Screening:   No question data found.     Fall Risk Screening:       3/22/2024     9:26 AM 3/21/2023     9:19 AM 3/21/2023     9:10 AM   Fall Risk   2 or more falls in past year? no no no   Fall with injury in past year? no no no           Health Maintenance: reviewed and discussed and ordered per Provider.    Health Maintenance Due   Topic Date Due    Respiratory Syncytial Virus (RSV) Pregnant or age 60 yrs+ (1 - 1-dose 60+ series) Never done    DTaP/Tdap/Td vaccine (3 - Td or Tdap) 02/20/2017    Shingles vaccine (3 of 3) 10/08/2019    COVID-19 Vaccine (5 - 2023-24 season) 09/01/2023    Annual Wellness Visit (Medicare Advantage)  01/01/2024    Depression Screen  03/21/2024         Coordination of Care:   1. \"Have you been to the ER, urgent care clinic since your last visit?  Hospitalized since your last visit?\" no    2. \"Have you seen or consulted any other health care providers outside of the [facility] since your last visit?\" no    3. For patients aged 45-75: Has the patient had a colonoscopy / FIT/ Cologuard?Yes - no Care Gap present  If the patient is female:    4. For patients aged 40-74: Has the patient 
today which included the patient's choices for care and treatment in the case of a health event that adversely affects decision-making abilities. He stated the Advance Care Directives on file are current. We discussed his current values, goals and care preferences at the end of life.  Roger Wiggins has no questions at this time and has agreed to keep me up-to-date should anything change.     1. Goals of medical treatment were reviewed .   2. Patient's stated goal/treatment preference is full code.  3. Others present during the discussion none   4. The discussion lasted 16 minutes.  5. No change in care plan.    Salo Lara MD  3/30/2024             Objective   Vitals:    03/22/24 0935 03/22/24 0937   BP: (!) 154/100 138/76   Site: Left Upper Arm Left Upper Arm   Position: Sitting Sitting   Cuff Size: Medium Adult Large Adult   Pulse: 89    Resp: 17    Temp: 98 °F (36.7 °C)    TempSrc: Temporal    SpO2: 95%    Weight: 110.6 kg (243 lb 12.8 oz)    Height: 1.829 m (6')       Body mass index is 33.07 kg/m².               No Known Allergies  Prior to Visit Medications    Medication Sig Taking? Authorizing Provider   cephALEXin (KEFLEX) 500 MG capsule Take 1 capsule by mouth 2 times daily for 7 days Yes Salo Lara MD   tamsulosin (FLOMAX) 0.4 MG capsule Take 2 capsules by mouth daily Yes Abi Foy PA-C       CareTeam (Including outside providers/suppliers regularly involved in providing care):   Patient Care Team:  Salo Lara MD as PCP - General  Salo Lara MD as PCP - Empaneled Provider     Reviewed and updated this visit:  Tobacco  Allergies  Meds  Problems  Med Hx  Surg Hx  Soc Hx  Fam Hx

## 2024-03-22 NOTE — PATIENT INSTRUCTIONS
Learning About Being Active as an Older Adult  Why is being active important as you get older?     Being active is one of the best things you can do for your health. And it's never too late to start. Being active--or getting active, if you aren't already--has definite benefits. It can:  Give you more energy,  Keep your mind sharp.  Improve balance to reduce your risk of falls.  Help you manage chronic illness with fewer medicines.  No matter how old you are, how fit you are, or what health problems you have, there is a form of activity that will work for you. And the more physical activity you can do, the better your overall health will be.  What kinds of activity can help you stay healthy?  Being more active will make your daily activities easier. Physical activity includes planned exercise and things you do in daily life. There are four types of activity:  Aerobic.  Doing aerobic activity makes your heart and lungs strong.  Includes walking, dancing, and gardening.  Aim for at least 2½ hours spread throughout the week.  It improves your energy and can help you sleep better.  Muscle-strengthening.  This type of activity can help maintain muscle and strengthen bones.  Includes climbing stairs, using resistance bands, and lifting or carrying heavy loads.  Aim for at least twice a week.  It can help protect the knees and other joints.  Stretching.  Stretching gives you better range of motion in joints and muscles.  Includes upper arm stretches, calf stretches, and gentle yoga.  Aim for at least twice a week, preferably after your muscles are warmed up from other activities.  It can help you function better in daily life.  Balancing.  This helps you stay coordinated and have good posture.  Includes heel-to-toe walking, siddhartha chi, and certain types of yoga.  Aim for at least 3 days a week.  It can reduce your risk of falling.  Even if you have a hard time meeting the recommendations, it's better to be more active

## 2024-04-16 ENCOUNTER — OFFICE VISIT (OUTPATIENT)
Facility: CLINIC | Age: 79
End: 2024-04-16
Payer: MEDICARE

## 2024-04-16 VITALS
BODY MASS INDEX: 33.51 KG/M2 | RESPIRATION RATE: 18 BRPM | HEIGHT: 72 IN | TEMPERATURE: 97.8 F | HEART RATE: 101 BPM | OXYGEN SATURATION: 92 % | WEIGHT: 247.4 LBS | SYSTOLIC BLOOD PRESSURE: 142 MMHG | DIASTOLIC BLOOD PRESSURE: 82 MMHG

## 2024-04-16 DIAGNOSIS — R60.0 BILATERAL LOWER EXTREMITY EDEMA: Primary | ICD-10-CM

## 2024-04-16 DIAGNOSIS — R06.02 SHORTNESS OF BREATH: ICD-10-CM

## 2024-04-16 DIAGNOSIS — C61 PROSTATE CANCER (HCC): ICD-10-CM

## 2024-04-16 DIAGNOSIS — N18.31 CHRONIC KIDNEY DISEASE, STAGE 3A (HCC): ICD-10-CM

## 2024-04-16 DIAGNOSIS — D47.Z2 CASTLEMAN DISEASE (HCC): ICD-10-CM

## 2024-04-16 PROCEDURE — 99214 OFFICE O/P EST MOD 30 MIN: CPT | Performed by: FAMILY MEDICINE

## 2024-04-16 PROCEDURE — 1123F ACP DISCUSS/DSCN MKR DOCD: CPT | Performed by: FAMILY MEDICINE

## 2024-04-16 RX ORDER — FUROSEMIDE 20 MG/1
20 TABLET ORAL DAILY
Qty: 30 TABLET | Refills: 3 | Status: SHIPPED | OUTPATIENT
Start: 2024-04-16

## 2024-04-16 RX ORDER — POTASSIUM CHLORIDE 750 MG/1
10 TABLET, EXTENDED RELEASE ORAL DAILY
Qty: 30 TABLET | Refills: 5 | Status: SHIPPED | OUTPATIENT
Start: 2024-04-16

## 2024-04-16 NOTE — PROGRESS NOTES
Roger Wiggins is a 79 y.o. presents today for   Chief Complaint   Patient presents with    Leg Swelling    Foot Swelling              Is someone accompanying this pt? NO    Is the patient using any DME equipment during OV? NO    Depression Screening:       3/22/2024     9:25 AM 3/21/2023     9:20 AM 10/6/2022     9:18 AM 3/15/2022     7:37 AM 3/30/2021     8:35 AM   PHQ-9 Questionaire   Little interest or pleasure in doing things 0 0 0 0 0   Feeling down, depressed, or hopeless 0 0 0 0 0   PHQ-9 Total Score 0 0 0 0 0       Abuse Screening:      3/22/2024     9:00 AM   AMB Abuse Screening   Do you ever feel afraid of your partner? N   Are you in a relationship with someone who physically or mentally threatens you? N   Is it safe for you to go home? Y       Learning Assessment:  No question data found.    Fall Risk:      3/22/2024     9:26 AM 3/21/2023     9:19 AM 3/21/2023     9:10 AM   Fall Risk   2 or more falls in past year? no no no   Fall with injury in past year? no no no           Coordination of Care:   1. \"Have you been to the ER, urgent care clinic since your last visit?  Hospitalized since your last visit?\" NO    2. \"Have you seen or consulted any other health care providers outside of the [facility] since your last visit?\" UROLOGY    3. For patients aged 45-75: Has the patient had a colonoscopy / FIT/ Cologuard? NO    If the patient is female:    4. For patients aged 40-74: Has the patient had a mammogram within the past 2 years? NO    5. For patients aged 21-65: Has the patient had a pap smear? NO    Health Maintenance: reviewed and discussed and ordered per Provider.    Health Maintenance Due   Topic Date Due    Respiratory Syncytial Virus (RSV) Pregnant or age 60 yrs+ (1 - 1-dose 60+ series) Never done    DTaP/Tdap/Td vaccine (3 - Td or Tdap) 02/20/2017    Shingles vaccine (3 of 3) 10/08/2019    COVID-19 Vaccine (5 - 2023-24 season) 09/01/2023        Beverly Hubbard LPN  [facility]

## 2024-04-16 NOTE — PROGRESS NOTES
Roger Wiggins is a 79 y.o.  male and presents with    Chief Complaint   Patient presents with    Leg Swelling    Foot Swelling            Subjective:  Edema  Patient complains of edema in both ankles and feet. The edema has been moderate. Onset of symptoms was 4 days ago, and patient reports symptoms have gradually worsened since that time. The edema is present all day. The patient states  he had similar symptoms while in patient after surgery and was given IV lasix . The swelling has been aggravated by nothing. The swelling has been relieved by elevation of involved area. Associated factors include: shortness of breath. Cardiac risk factors include advanced age (older than 55 for men, 65 for women), hypertension, male gender, and obesity (BMI >= 30 kg/m2).    He had mcmillan catheter removed 2 weeks ago  ROS   Constitutional:  Negative for chills and fever.   HENT:  Negative for ear pain, hearing loss, sinus pressure, sinus pain, sore throat and trouble swallowing.    Eyes:  Negative for pain and visual disturbance.   Gastrointestinal:  Negative for blood in stool, constipation, diarrhea, nausea and vomiting.   Endocrine: Negative for polydipsia.   Genitourinary:  Negative for penile discharge, penile pain, penile swelling, scrotal swelling and testicular pain.   Psychiatric/Behavioral:  Negative for self-injury, sleep disturbance and suicidal ideas.         All other systems reviewed and are negative.      Objective:  BP (!) 142/82 (Site: Left Upper Arm, Position: Sitting, Cuff Size: Large Adult)   Pulse (!) 101   Temp 97.8 °F (36.6 °C) (Temporal)   Resp 18   Ht 1.829 m (6')   Wt 112.2 kg (247 lb 6.4 oz)   SpO2 92%   BMI 33.55 kg/m²         alert, well appearing, and in no distress, oriented to person, place, and time, and obese  Mental status - normal mood, behavior, speech, dress, motor activity, and thought processes  Chest - clear to auscultation, no wheezes, rales or rhonchi, symmetric

## 2024-04-17 LAB — BNP SERPL-MCNC: 36.5 PG/ML (ref 0–100)

## 2024-04-19 ENCOUNTER — OFFICE VISIT (OUTPATIENT)
Facility: CLINIC | Age: 79
End: 2024-04-19
Payer: MEDICARE

## 2024-04-19 VITALS
DIASTOLIC BLOOD PRESSURE: 80 MMHG | OXYGEN SATURATION: 91 % | HEART RATE: 106 BPM | WEIGHT: 245.4 LBS | HEIGHT: 72 IN | RESPIRATION RATE: 17 BRPM | BODY MASS INDEX: 33.24 KG/M2 | TEMPERATURE: 96.9 F | SYSTOLIC BLOOD PRESSURE: 138 MMHG

## 2024-04-19 DIAGNOSIS — J40 BRONCHITIS: ICD-10-CM

## 2024-04-19 DIAGNOSIS — R60.0 BILATERAL LOWER EXTREMITY EDEMA: ICD-10-CM

## 2024-04-19 DIAGNOSIS — R06.02 SHORTNESS OF BREATH: Primary | ICD-10-CM

## 2024-04-19 PROCEDURE — 99215 OFFICE O/P EST HI 40 MIN: CPT | Performed by: FAMILY MEDICINE

## 2024-04-19 PROCEDURE — 93000 ELECTROCARDIOGRAM COMPLETE: CPT | Performed by: FAMILY MEDICINE

## 2024-04-19 PROCEDURE — 1123F ACP DISCUSS/DSCN MKR DOCD: CPT | Performed by: FAMILY MEDICINE

## 2024-04-19 RX ORDER — PREDNISONE 10 MG/1
TABLET ORAL
Qty: 30 TABLET | Refills: 0 | Status: SHIPPED | OUTPATIENT
Start: 2024-04-19

## 2024-04-19 RX ORDER — AZITHROMYCIN 250 MG/1
TABLET, FILM COATED ORAL
Qty: 6 TABLET | Refills: 0 | Status: SHIPPED | OUTPATIENT
Start: 2024-04-19 | End: 2024-04-29

## 2024-04-19 SDOH — ECONOMIC STABILITY: FOOD INSECURITY: WITHIN THE PAST 12 MONTHS, THE FOOD YOU BOUGHT JUST DIDN'T LAST AND YOU DIDN'T HAVE MONEY TO GET MORE.: NEVER TRUE

## 2024-04-19 SDOH — ECONOMIC STABILITY: FOOD INSECURITY: WITHIN THE PAST 12 MONTHS, YOU WORRIED THAT YOUR FOOD WOULD RUN OUT BEFORE YOU GOT MONEY TO BUY MORE.: NEVER TRUE

## 2024-04-19 SDOH — ECONOMIC STABILITY: INCOME INSECURITY: HOW HARD IS IT FOR YOU TO PAY FOR THE VERY BASICS LIKE FOOD, HOUSING, MEDICAL CARE, AND HEATING?: NOT HARD AT ALL

## 2024-04-19 ASSESSMENT — PATIENT HEALTH QUESTIONNAIRE - PHQ9
SUM OF ALL RESPONSES TO PHQ QUESTIONS 1-9: 0
SUM OF ALL RESPONSES TO PHQ QUESTIONS 1-9: 0
1. LITTLE INTEREST OR PLEASURE IN DOING THINGS: NOT AT ALL
SUM OF ALL RESPONSES TO PHQ QUESTIONS 1-9: 0
SUM OF ALL RESPONSES TO PHQ9 QUESTIONS 1 & 2: 0
2. FEELING DOWN, DEPRESSED OR HOPELESS: NOT AT ALL
SUM OF ALL RESPONSES TO PHQ QUESTIONS 1-9: 0

## 2024-04-19 NOTE — PROGRESS NOTES
Roger Wiggins is a 79 y.o.  male and presents with    Chief Complaint   Patient presents with    Leg Swelling    Shortness of Breath     Subjective:  Mr. Wiggins presents with his wife for shortness of breath.  He was treated earlier this week for leg swelling and has been using lasix;  he mowed the lawn 2 days ago and became short of breath yesterday.  He denies h/o allergies but has had increase in mucus.  He can hear himself wheeze.      He denies chest pain.          ROS   Constitutional:  Negative for chills and fever.   HENT:  Negative for ear pain, hearing loss, sinus pressure, sinus pain, sore throat and trouble swallowing.    Eyes:  Negative for pain and visual disturbance.   Gastrointestinal:  Negative for blood in stool, constipation, diarrhea, nausea and vomiting.   Endocrine: Negative for polydipsia.   Genitourinary:  Negative for penile discharge, penile pain, penile swelling, scrotal swelling and testicular pain.   Psychiatric/Behavioral:  Negative for self-injury, sleep disturbance and suicidal ideas.  her systems reviewed and are negative.      Objective:  /80 (Site: Left Upper Arm, Position: Sitting)   Pulse (!) 106   Temp 96.9 °F (36.1 °C) (Temporal)   Resp 17   Ht 1.829 m (6')   Wt 111.3 kg (245 lb 6.4 oz)   SpO2 91%   BMI 33.28 kg/m²         alert, well appearing, and in no distress, oriented to person, place, and time, and obese  Mental status - normal mood, behavior, speech, dress, motor activity, and thought processes  Nose - clear rhinorrhea  Chest - wheezing noted diffuse  Heart - tachycardia; no murmurs  Extremities - pedal edema 1 +    LABS   BNP normal  TESTS  EKG sinus rhythm    Assessment/Plan:    1. Shortness of breath  Normal EKG; chest xray ordered; symptoms started the day after mowing yard and include increased mucus; ddx allergies, bronchitis, PE; improved with albuterol  - EKG 12 Lead  - XR CHEST (2 VIEWS); Future    2. Bilateral lower extremity

## 2024-04-19 NOTE — PROGRESS NOTES
Roger Wiggins is a 79 y.o. presents today for   Chief Complaint   Patient presents with    Leg Swelling    Shortness of Breath     Is someone accompanying this pt? no    Is the patient using any DME equipment during OV? no  There were no vitals filed for this visit.    Depression Screening:       3/22/2024     9:25 AM 3/21/2023     9:20 AM 10/6/2022     9:18 AM 3/15/2022     7:37 AM 3/30/2021     8:35 AM   PHQ-9 Questionaire   Little interest or pleasure in doing things 0 0 0 0 0   Feeling down, depressed, or hopeless 0 0 0 0 0   PHQ-9 Total Score 0 0 0 0 0        Abuse Screening:      3/22/2024     9:00 AM   AMB Abuse Screening   Do you ever feel afraid of your partner? N   Are you in a relationship with someone who physically or mentally threatens you? N   Is it safe for you to go home? Y        Learning Assessment Screening:   No question data found.     Fall Risk Screening:       3/22/2024     9:26 AM 3/21/2023     9:19 AM 3/21/2023     9:10 AM   Fall Risk   2 or more falls in past year? no no no   Fall with injury in past year? no no no           Health Maintenance: reviewed and discussed and ordered per Provider.    Health Maintenance Due   Topic Date Due    Respiratory Syncytial Virus (RSV) Pregnant or age 60 yrs+ (1 - 1-dose 60+ series) Never done    DTaP/Tdap/Td vaccine (3 - Td or Tdap) 02/20/2017    Shingles vaccine (3 of 3) 10/08/2019    COVID-19 Vaccine (5 - 2023-24 season) 09/01/2023         Coordination of Care:   1. \"Have you been to the ER, urgent care clinic since your last visit?  Hospitalized since your last visit?\" no    2. \"Have you seen or consulted any other health care providers outside of the [facility] since your last visit?\" no    3. For patients aged 45-75: Has the patient had a colonoscopy / FIT/ Cologuard?Yes - no Care Gap present  If the patient is female:    4. For patients aged 40-74: Has the patient had a mammogram within the past 2 years? NA - based on age or

## 2024-04-22 ENCOUNTER — TELEPHONE (OUTPATIENT)
Facility: CLINIC | Age: 79
End: 2024-04-22

## 2024-04-22 NOTE — TELEPHONE ENCOUNTER
Pt states he saw you on Friday and if he gets worse you wanted himto go to the hospital. Pt states he was admitted to [facility] Saturday evening. He states he has a blood clot and pneumonia.

## 2024-04-26 ENCOUNTER — OFFICE VISIT (OUTPATIENT)
Facility: CLINIC | Age: 79
End: 2024-04-26

## 2024-04-26 VITALS
WEIGHT: 232.6 LBS | HEIGHT: 72 IN | SYSTOLIC BLOOD PRESSURE: 137 MMHG | BODY MASS INDEX: 31.51 KG/M2 | TEMPERATURE: 97.3 F | RESPIRATION RATE: 17 BRPM | OXYGEN SATURATION: 96 % | HEART RATE: 104 BPM | DIASTOLIC BLOOD PRESSURE: 87 MMHG

## 2024-04-26 DIAGNOSIS — R33.9 URINARY RETENTION: ICD-10-CM

## 2024-04-26 DIAGNOSIS — C61 PROSTATE CANCER (HCC): ICD-10-CM

## 2024-04-26 DIAGNOSIS — I26.94 MULTIPLE SUBSEGMENTAL PULMONARY EMBOLI WITHOUT ACUTE COR PULMONALE (HCC): Primary | ICD-10-CM

## 2024-04-26 DIAGNOSIS — N18.31 CHRONIC KIDNEY DISEASE, STAGE 3A (HCC): ICD-10-CM

## 2024-04-26 DIAGNOSIS — N32.89 BLADDER SPASM: ICD-10-CM

## 2024-04-26 DIAGNOSIS — Z09 HOSPITAL DISCHARGE FOLLOW-UP: ICD-10-CM

## 2024-04-26 RX ORDER — AMLODIPINE BESYLATE 5 MG/1
10 TABLET ORAL DAILY
COMMUNITY
Start: 2024-04-24

## 2024-04-26 RX ORDER — APIXABAN 5 MG/1
TABLET, FILM COATED ORAL
COMMUNITY
Start: 2024-04-23

## 2024-04-26 RX ORDER — TROSPIUM CHLORIDE 20 MG/1
20 TABLET, FILM COATED ORAL 2 TIMES DAILY
COMMUNITY
Start: 2024-04-24 | End: 2024-04-29

## 2024-04-26 RX ORDER — AMOXICILLIN AND CLAVULANATE POTASSIUM 875; 125 MG/1; MG/1
1 TABLET, FILM COATED ORAL EVERY 12 HOURS
COMMUNITY
Start: 2024-04-23 | End: 2024-04-26

## 2024-04-26 SDOH — ECONOMIC STABILITY: FOOD INSECURITY: WITHIN THE PAST 12 MONTHS, THE FOOD YOU BOUGHT JUST DIDN'T LAST AND YOU DIDN'T HAVE MONEY TO GET MORE.: NEVER TRUE

## 2024-04-26 SDOH — ECONOMIC STABILITY: FOOD INSECURITY: WITHIN THE PAST 12 MONTHS, YOU WORRIED THAT YOUR FOOD WOULD RUN OUT BEFORE YOU GOT MONEY TO BUY MORE.: NEVER TRUE

## 2024-04-26 SDOH — ECONOMIC STABILITY: INCOME INSECURITY: HOW HARD IS IT FOR YOU TO PAY FOR THE VERY BASICS LIKE FOOD, HOUSING, MEDICAL CARE, AND HEATING?: NOT HARD AT ALL

## 2024-04-26 ASSESSMENT — PATIENT HEALTH QUESTIONNAIRE - PHQ9
1. LITTLE INTEREST OR PLEASURE IN DOING THINGS: NOT AT ALL
SUM OF ALL RESPONSES TO PHQ QUESTIONS 1-9: 0
SUM OF ALL RESPONSES TO PHQ9 QUESTIONS 1 & 2: 0
2. FEELING DOWN, DEPRESSED OR HOPELESS: NOT AT ALL
SUM OF ALL RESPONSES TO PHQ QUESTIONS 1-9: 0

## 2024-04-26 NOTE — PROGRESS NOTES
Roger Wiggins is a 79 y.o.  male and presents with    Chief Complaint   Patient presents with    Follow-Up from Hospital    Medication Check     4/21/2024 admitted for PE and pneumonia  4/24/2024 discharged to home  4/26/2024 transition of care      Subjective:  Mr. Wiggins presents with his wife for hospital follow up; he has PE and is using eliquis; his shortness of breath has improved; his edema has improved; he was on oxygen while inpatient but is now improved with oxygen saturation.      He has h/o DVT and stopped his anticoagulation last year.      He has urinary retention and has indwelling mcmillan catheter.  He is taking trospium for bladder spasm.    ROS   Constitutional:  Negative for chills and fever.   HENT:  Negative for ear pain, hearing loss, sinus pressure, sinus pain, sore throat and trouble swallowing.    Eyes:  Negative for pain and visual disturbance.   Gastrointestinal:  Negative for blood in stool, constipation, diarrhea, nausea and vomiting.   Endocrine: Negative for polydipsia.   Genitourinary:  see HPI  Psychiatric/Behavioral:  Negative for self-injury, sleep disturbance and suicidal ideas.  her systems reviewed and are negative.    All other systems reviewed and are negative.      Objective:  /87 (Site: Right Upper Arm)   Pulse (!) 104   Temp 97.3 °F (36.3 °C) (Temporal)   Resp 17   Ht 1.829 m (6')   Wt 105.5 kg (232 lb 9.6 oz)   SpO2 96%   BMI 31.55 kg/m²         alert, well appearing, and in no distress, oriented to person, place, and time, and obese  Mental status - normal mood, behavior, speech, dress, motor activity, and thought processes  Chest - clear to auscultation, no wheezes, rales or rhonchi, symmetric air entry  Heart - normal rate, regular rhythm, normal S1, S2, no murmurs, rubs, clicks or gallops   Male - mcmillan catheter in place  Ext - trace edema    LABS   Hgb 9.2  GFR 50  TESTS  NM LUNG SCAN PERFUSION ONLY  Order:

## 2024-04-26 NOTE — PROGRESS NOTES
Roger Wiggins is a 79 y.o. presents today for   Chief Complaint   Patient presents with    Follow-Up from Hospital     Is someone accompanying this pt? yes,     Is the patient using any DME equipment during OV? no  There were no vitals filed for this visit.    Depression Screenin/19/2024    10:52 AM 3/22/2024     9:25 AM 3/21/2023     9:20 AM 10/6/2022     9:18 AM 3/15/2022     7:37 AM 3/30/2021     8:35 AM   PHQ-9 Questionaire   Little interest or pleasure in doing things 0 0 0 0 0 0   Feeling down, depressed, or hopeless 0 0 0 0 0 0   PHQ-9 Total Score 0 0 0 0 0 0        Abuse Screening:      3/22/2024     9:00 AM   AMB Abuse Screening   Do you ever feel afraid of your partner? N   Are you in a relationship with someone who physically or mentally threatens you? N   Is it safe for you to go home? Y        Learning Assessment Screening:   No question data found.     Fall Risk Screening:       3/22/2024     9:26 AM 3/21/2023     9:19 AM 3/21/2023     9:10 AM   Fall Risk   2 or more falls in past year? no no no   Fall with injury in past year? no no no           Health Maintenance: reviewed and discussed and ordered per Provider.    Health Maintenance Due   Topic Date Due    Respiratory Syncytial Virus (RSV) Pregnant or age 60 yrs+ (1 - 1-dose 60+ series) Never done    DTaP/Tdap/Td vaccine (3 - Td or Tdap) 2017    Shingles vaccine (3 of 3) 10/08/2019    COVID-19 Vaccine ( season) 2023         Coordination of Care:   1. \"Have you been to the ER, urgent care clinic since your last visit?  Hospitalized since your last visit?\" yes,     2. \"Have you seen or consulted any other health care providers outside of the [facility] since your last visit?\" no    3. For patients aged 45-75: Has the patient had a colonoscopy / FIT/ Cologuard?Yes - no Care Gap present  If the patient is female:    4. For patients aged 40-74: Has the patient had a mammogram within the past 2 years? NA -

## 2024-04-27 LAB
BASOPHILS # BLD AUTO: 0 X10E3/UL (ref 0–0.2)
BASOPHILS NFR BLD AUTO: 1 %
EOSINOPHIL # BLD AUTO: 0.3 X10E3/UL (ref 0–0.4)
EOSINOPHIL NFR BLD AUTO: 4 %
ERYTHROCYTE [DISTWIDTH] IN BLOOD BY AUTOMATED COUNT: 13 % (ref 11.6–15.4)
HCT VFR BLD AUTO: 32.8 % (ref 37.5–51)
HGB BLD-MCNC: 10.4 G/DL (ref 13–17.7)
IMM GRANULOCYTES # BLD AUTO: 0 X10E3/UL (ref 0–0.1)
IMM GRANULOCYTES NFR BLD AUTO: 0 %
LYMPHOCYTES # BLD AUTO: 1.4 X10E3/UL (ref 0.7–3.1)
LYMPHOCYTES NFR BLD AUTO: 16 %
MCH RBC QN AUTO: 28.2 PG (ref 26.6–33)
MCHC RBC AUTO-ENTMCNC: 31.7 G/DL (ref 31.5–35.7)
MCV RBC AUTO: 89 FL (ref 79–97)
MONOCYTES # BLD AUTO: 0.6 X10E3/UL (ref 0.1–0.9)
MONOCYTES NFR BLD AUTO: 7 %
NEUTROPHILS # BLD AUTO: 6.3 X10E3/UL (ref 1.4–7)
NEUTROPHILS NFR BLD AUTO: 72 %
PLATELET # BLD AUTO: 312 X10E3/UL (ref 150–450)
RBC # BLD AUTO: 3.69 X10E6/UL (ref 4.14–5.8)
WBC # BLD AUTO: 8.6 X10E3/UL (ref 3.4–10.8)

## 2024-04-30 ENCOUNTER — TELEPHONE (OUTPATIENT)
Facility: CLINIC | Age: 79
End: 2024-04-30

## 2024-04-30 DIAGNOSIS — N32.89 BLADDER SPASM: Primary | ICD-10-CM

## 2024-04-30 RX ORDER — TROSPIUM CHLORIDE 20 MG/1
20 TABLET, FILM COATED ORAL 2 TIMES DAILY
Qty: 180 TABLET | Refills: 4 | Status: SHIPPED | OUTPATIENT
Start: 2024-04-30 | End: 2025-07-24

## 2024-04-30 NOTE — TELEPHONE ENCOUNTER
Pt states he was in the hospital last week and saw Dr. Lara in the office on Friday. He states he was discharged with Trostium Chloride 20 mg. And wants to know if you could call this medication into the pharm. This medication was not on his med list.     Jovan Perez     LOV: 4/26/2024    Next OV: No follow Up Apt.

## 2024-05-11 DIAGNOSIS — N30.01 ACUTE CYSTITIS WITH HEMATURIA: ICD-10-CM

## 2024-05-13 RX ORDER — CEPHALEXIN 500 MG/1
CAPSULE ORAL
Qty: 14 CAPSULE | Refills: 0 | Status: SHIPPED | OUTPATIENT
Start: 2024-05-13

## 2024-05-14 RX ORDER — APIXABAN 5 MG/1
5 TABLET, FILM COATED ORAL 2 TIMES DAILY
Qty: 60 TABLET | Refills: 3 | Status: SHIPPED | OUTPATIENT
Start: 2024-05-14

## 2024-05-14 RX ORDER — AMLODIPINE BESYLATE 5 MG/1
10 TABLET ORAL DAILY
Qty: 90 TABLET | Refills: 3 | Status: SHIPPED | OUTPATIENT
Start: 2024-05-14

## 2024-05-14 NOTE — TELEPHONE ENCOUNTER
"Regarding: Paxlovid - Ache, fatigue & congested, feeling foggy COVID Positive  ----- Message from Yanci Velasquez sent at 12/26/2022  5:15 PM CST -----  Patient Name: Trevor Lopez    Specialist or PCP Name: Dr. Jan Dominguez     Symptoms: Ache, fatigue & congested, feeling foggy COVID Positive     Pregnant (females aged 14-63. If Yes, how long?) : N/A     Call Back # : [phone]    Which State are you currently located in?: [address] [facility]     Name of Clinic Site / Acct# : [address] [facility]     Use following scripting for patients waiting for a callback: ""Nurse callback times vary based on call volumes; please be aware the return phone call may come from an unidentified phone number. If your symptoms worsen or become life threatening while waiting, you should seek immediate assistance by calling 911 or going to the ER for evaluation. \""    " MEDICATION REFILL REQUESTS      ELIQUIS 5 MG TABS tablet       amLODIPine (NORVASC) 5 MG tablet       The medications were prescribed to the pt from the ER. Pt says he is out of the medication and states he is suppose to take them for 6 months.    Please follow up when available.    Thank you!

## 2024-07-10 ENCOUNTER — OFFICE VISIT (OUTPATIENT)
Facility: CLINIC | Age: 79
End: 2024-07-10
Payer: MEDICARE

## 2024-07-10 VITALS
DIASTOLIC BLOOD PRESSURE: 62 MMHG | RESPIRATION RATE: 17 BRPM | OXYGEN SATURATION: 98 % | SYSTOLIC BLOOD PRESSURE: 99 MMHG | BODY MASS INDEX: 31.45 KG/M2 | HEART RATE: 88 BPM | HEIGHT: 72 IN | TEMPERATURE: 98 F | WEIGHT: 232.2 LBS

## 2024-07-10 DIAGNOSIS — R60.0 BILATERAL LOWER EXTREMITY EDEMA: Primary | ICD-10-CM

## 2024-07-10 DIAGNOSIS — B36.0 TINEA VERSICOLOR: ICD-10-CM

## 2024-07-10 DIAGNOSIS — R06.02 SHORTNESS OF BREATH: ICD-10-CM

## 2024-07-10 DIAGNOSIS — C61 PROSTATE CANCER (HCC): ICD-10-CM

## 2024-07-10 PROCEDURE — 99214 OFFICE O/P EST MOD 30 MIN: CPT | Performed by: FAMILY MEDICINE

## 2024-07-10 PROCEDURE — 1123F ACP DISCUSS/DSCN MKR DOCD: CPT | Performed by: FAMILY MEDICINE

## 2024-07-10 RX ORDER — POTASSIUM CHLORIDE 750 MG/1
20 TABLET, EXTENDED RELEASE ORAL DAILY
Qty: 180 TABLET | Refills: 3 | Status: SHIPPED | OUTPATIENT
Start: 2024-07-10 | End: 2024-07-10

## 2024-07-10 RX ORDER — FUROSEMIDE 20 MG/1
20 TABLET ORAL DAILY
Qty: 90 TABLET | Refills: 3 | Status: SHIPPED | OUTPATIENT
Start: 2024-07-10

## 2024-07-10 RX ORDER — KETOCONAZOLE 200 MG/1
200 TABLET ORAL WEEKLY
Qty: 4 TABLET | Refills: 0 | Status: SHIPPED | OUTPATIENT
Start: 2024-07-10

## 2024-07-10 RX ORDER — FUROSEMIDE 20 MG/1
40 TABLET ORAL DAILY
Qty: 180 TABLET | Refills: 3 | Status: SHIPPED | OUTPATIENT
Start: 2024-07-10 | End: 2024-07-10

## 2024-07-10 RX ORDER — POTASSIUM CHLORIDE 750 MG/1
10 TABLET, EXTENDED RELEASE ORAL DAILY
Qty: 90 TABLET | Refills: 3 | Status: SHIPPED | OUTPATIENT
Start: 2024-07-10

## 2024-07-10 RX ORDER — SELENIUM SULFIDE 2.5 MG/100ML
LOTION TOPICAL
Qty: 118 ML | Refills: 1 | Status: SHIPPED | OUTPATIENT
Start: 2024-07-10 | End: 2024-08-09

## 2024-07-10 SDOH — ECONOMIC STABILITY: FOOD INSECURITY: WITHIN THE PAST 12 MONTHS, THE FOOD YOU BOUGHT JUST DIDN'T LAST AND YOU DIDN'T HAVE MONEY TO GET MORE.: NEVER TRUE

## 2024-07-10 SDOH — ECONOMIC STABILITY: FOOD INSECURITY: WITHIN THE PAST 12 MONTHS, YOU WORRIED THAT YOUR FOOD WOULD RUN OUT BEFORE YOU GOT MONEY TO BUY MORE.: NEVER TRUE

## 2024-07-10 SDOH — ECONOMIC STABILITY: INCOME INSECURITY: HOW HARD IS IT FOR YOU TO PAY FOR THE VERY BASICS LIKE FOOD, HOUSING, MEDICAL CARE, AND HEATING?: NOT VERY HARD

## 2024-07-10 NOTE — PROGRESS NOTES
Roger Wiggins is a 79 y.o. presents today for   Chief Complaint   Patient presents with    back itch    Medication Refill     Is someone accompanying this pt? no    Is the patient using any DME equipment during OV? no  There were no vitals filed for this visit.    Depression Screenin/10/2024    10:19 AM 2024    11:26 AM 2024    10:52 AM 3/22/2024     9:25 AM 3/21/2023     9:20 AM 10/6/2022     9:18 AM 3/15/2022     7:37 AM   PHQ-9 Questionaire   Little interest or pleasure in doing things 0 0 0 0 0 0 0   Feeling down, depressed, or hopeless 0 0 0 0 0 0 0   PHQ-9 Total Score 0 0 0 0 0 0 0        Abuse Screening:      3/22/2024     9:00 AM   AMB Abuse Screening   Do you ever feel afraid of your partner? N   Are you in a relationship with someone who physically or mentally threatens you? N   Is it safe for you to go home? Y        Learning Assessment Screening:   No question data found.     Fall Risk Screening:       3/22/2024     9:26 AM 3/21/2023     9:19 AM 3/21/2023     9:10 AM   Fall Risk   2 or more falls in past year? no no no   Fall with injury in past year? no no no           Health Maintenance: reviewed and discussed and ordered per Provider.    Health Maintenance Due   Topic Date Due    Respiratory Syncytial Virus (RSV) Pregnant or age 60 yrs+ (1 - 1-dose 60+ series) Never done    DTaP/Tdap/Td vaccine (3 - Td or Tdap) 2017    Shingles vaccine (3 of 3) 10/08/2019    COVID-19 Vaccine ( season) 2023         Coordination of Care:   1. \"Have you been to the ER, urgent care clinic since your last visit?  Hospitalized since your last visit?\" no    2. \"Have you seen or consulted any other health care providers outside of the [facility] since your last visit?\" no    3. For patients aged 45-75: Has the patient had a colonoscopy / FIT/ Cologuard?Yes - no Care Gap present  If the patient is female:    4. For patients aged 40-74: Has the patient had a mammogram 
masses or organomegaly  Back exam - full range of motion, no tenderness, palpable spasm or pain on motion  Neurological - alert, oriented, normal speech, no focal findings or movement disorder noted  Musculoskeletal - no joint tenderness, deformity or swelling  Extremities - peripheral pulses normal, no clubbing or cyanosis, pedal edema 2 +, intact peripheral pulses  Skin - normal coloration and turgor, no rashes, no suspicious skin lesions noted  LESIONS NOTED: sebaceous cyst on the upper back  LESIONS NOTED: pigmented patches on the upper back      LABS   Hepatic function panel    TESTS    Assessment/Plan:    1. Bilateral lower extremity edema  Diuretic ordered  - furosemide (LASIX) 20 MG tablet; Take 1 tablet by mouth daily  Dispense: 90 tablet; Refill: 3  - potassium chloride (KLOR-CON M) 10 MEQ extended release tablet; Take 1 tablet by mouth daily  Dispense: 90 tablet; Refill: 3    2. Shortness of breath  Diuretic ordered  - furosemide (LASIX) 20 MG tablet; Take 1 tablet by mouth daily  Dispense: 90 tablet; Refill: 3  - potassium chloride (KLOR-CON M) 10 MEQ extended release tablet; Take 1 tablet by mouth daily  Dispense: 90 tablet; Refill: 3    3. Tinea versicolor  Systemic and topical treatment ordered  - Hepatic Function Panel; Future  - ketoconazole (NIZORAL) 200 MG tablet; Take 1 tablet by mouth once a week  Dispense: 4 tablet; Refill: 0  - selenium sulfide (SELSUN) 2.5 % lotion; Apply topically daily as needed.  Dispense: 118 mL; Refill: 1    4. Prostate cancer (HCC)  Follow up with urologist     Lab review: labs are reviewed, up to date and normal    I have discussed the diagnosis with the patient and the intended plan as seen in the above orders.  The patient has received an after-visit summary and questions were answered concerning future plans.  I have discussed medication side effects and warnings with the patient as well. I have reviewed the plan of care with the patient, accepted their input and 
Validate Note Data When Using Inventory: Yes

## 2024-07-11 LAB
ALBUMIN SERPL-MCNC: 4.2 G/DL (ref 3.8–4.8)
ALP SERPL-CCNC: 60 IU/L (ref 44–121)
ALT SERPL-CCNC: 10 IU/L (ref 0–44)
AST SERPL-CCNC: 12 IU/L (ref 0–40)
BILIRUB DIRECT SERPL-MCNC: <0.1 MG/DL (ref 0–0.4)
BILIRUB SERPL-MCNC: 0.3 MG/DL (ref 0–1.2)
PROT SERPL-MCNC: 7.6 G/DL (ref 6–8.5)

## 2024-07-31 ENCOUNTER — TELEPHONE (OUTPATIENT)
Facility: CLINIC | Age: 79
End: 2024-07-31

## 2024-07-31 NOTE — TELEPHONE ENCOUNTER
[facility] is faxing over a form for provider to sign off  so patient can stop taking     ELIQUIS 5 MG TABS tablet     Having surgery next week       Please advise    Thank you

## 2024-08-19 ENCOUNTER — TELEPHONE (OUTPATIENT)
Facility: CLINIC | Age: 79
End: 2024-08-19

## 2024-08-19 NOTE — TELEPHONE ENCOUNTER
Pharmacy is calling to report that they gave the patient the incorrect medication, incident has been reported, and patient notified and script replace.       Just for communication       Please advise    Thank you

## 2024-08-20 ENCOUNTER — TELEPHONE (OUTPATIENT)
Facility: CLINIC | Age: 79
End: 2024-08-20

## 2024-08-20 NOTE — TELEPHONE ENCOUNTER
Pt called the office and says he had surgery yesterday and was advised to stop taking his Rx of ELIQUIS for 7 days (prior to his surgery). Pt would like to know if he can continue taking the medication or not.    Please advise

## 2024-09-03 ENCOUNTER — OFFICE VISIT (OUTPATIENT)
Facility: CLINIC | Age: 79
End: 2024-09-03
Payer: MEDICARE

## 2024-09-03 VITALS
TEMPERATURE: 97.6 F | DIASTOLIC BLOOD PRESSURE: 74 MMHG | WEIGHT: 229.8 LBS | HEART RATE: 89 BPM | HEIGHT: 72 IN | OXYGEN SATURATION: 100 % | RESPIRATION RATE: 17 BRPM | SYSTOLIC BLOOD PRESSURE: 116 MMHG | BODY MASS INDEX: 31.13 KG/M2

## 2024-09-03 DIAGNOSIS — I10 PRIMARY HYPERTENSION: ICD-10-CM

## 2024-09-03 DIAGNOSIS — C61 PROSTATE CANCER (HCC): ICD-10-CM

## 2024-09-03 DIAGNOSIS — E78.00 HYPERCHOLESTEROLEMIA: ICD-10-CM

## 2024-09-03 DIAGNOSIS — N39.41 URGE INCONTINENCE OF URINE: Primary | ICD-10-CM

## 2024-09-03 DIAGNOSIS — B36.0 TINEA VERSICOLOR: ICD-10-CM

## 2024-09-03 DIAGNOSIS — N18.31 CHRONIC KIDNEY DISEASE, STAGE 3A (HCC): ICD-10-CM

## 2024-09-03 PROCEDURE — 1123F ACP DISCUSS/DSCN MKR DOCD: CPT | Performed by: FAMILY MEDICINE

## 2024-09-03 PROCEDURE — 99214 OFFICE O/P EST MOD 30 MIN: CPT | Performed by: FAMILY MEDICINE

## 2024-09-03 PROCEDURE — 3074F SYST BP LT 130 MM HG: CPT | Performed by: FAMILY MEDICINE

## 2024-09-03 PROCEDURE — 3078F DIAST BP <80 MM HG: CPT | Performed by: FAMILY MEDICINE

## 2024-09-03 RX ORDER — AMLODIPINE BESYLATE 5 MG/1
5 TABLET ORAL DAILY
Qty: 90 TABLET | Refills: 3 | Status: SHIPPED | OUTPATIENT
Start: 2024-09-03

## 2024-09-03 SDOH — ECONOMIC STABILITY: FOOD INSECURITY: WITHIN THE PAST 12 MONTHS, THE FOOD YOU BOUGHT JUST DIDN'T LAST AND YOU DIDN'T HAVE MONEY TO GET MORE.: NEVER TRUE

## 2024-09-03 SDOH — ECONOMIC STABILITY: INCOME INSECURITY: HOW HARD IS IT FOR YOU TO PAY FOR THE VERY BASICS LIKE FOOD, HOUSING, MEDICAL CARE, AND HEATING?: NOT VERY HARD

## 2024-09-03 SDOH — ECONOMIC STABILITY: FOOD INSECURITY: WITHIN THE PAST 12 MONTHS, YOU WORRIED THAT YOUR FOOD WOULD RUN OUT BEFORE YOU GOT MONEY TO BUY MORE.: NEVER TRUE

## 2024-09-03 ASSESSMENT — PATIENT HEALTH QUESTIONNAIRE - PHQ9
SUM OF ALL RESPONSES TO PHQ QUESTIONS 1-9: 0
SUM OF ALL RESPONSES TO PHQ9 QUESTIONS 1 & 2: 0
2. FEELING DOWN, DEPRESSED OR HOPELESS: NOT AT ALL
1. LITTLE INTEREST OR PLEASURE IN DOING THINGS: NOT AT ALL

## 2024-09-03 NOTE — PROGRESS NOTES
Roger Wiggins is a 79 y.o. presents today for   Chief Complaint   Patient presents with    Medication Refill     Is someone accompanying this pt? no    Is the patient using any DME equipment during OV? no  There were no vitals filed for this visit.    Depression Screenin/3/2024     8:14 AM 7/10/2024    10:19 AM 2024    11:26 AM 2024    10:52 AM 3/22/2024     9:25 AM 3/21/2023     9:20 AM 10/6/2022     9:18 AM   PHQ-9 Questionaire   Little interest or pleasure in doing things 0 0 0 0 0 0 0   Feeling down, depressed, or hopeless 0 0 0 0 0 0 0   PHQ-9 Total Score 0 0 0 0 0 0 0        Abuse Screening:      3/22/2024     9:00 AM   AMB Abuse Screening   Do you ever feel afraid of your partner? N   Are you in a relationship with someone who physically or mentally threatens you? N   Is it safe for you to go home? Y        Learning Assessment Screening:   No question data found.     Fall Risk Screening:       3/22/2024     9:26 AM 3/21/2023     9:19 AM 3/21/2023     9:10 AM   Fall Risk   Do you feel unsteady or are you worried about falling?  no no no   2 or more falls in past year? no no no   Fall with injury in past year? no no no           Health Maintenance: reviewed and discussed and ordered per Provider.    Health Maintenance Due   Topic Date Due    Respiratory Syncytial Virus (RSV) Pregnant or age 60 yrs+ (1 - 1-dose 60+ series) Never done    DTaP/Tdap/Td vaccine (3 - Td or Tdap) 2017    Shingles vaccine (3 of 3) 10/08/2019    Flu vaccine (1) 2024    COVID-19 Vaccine ( season) 2024         Coordination of Care:   1. \"Have you been to the ER, urgent care clinic since your last visit?  Hospitalized since your last visit?\" no    2. \"Have you seen or consulted any other health care providers outside of the [facility] since your last visit?\" no    3. For patients aged 45-75: Has the patient had a colonoscopy / FIT/ Cologuard?Yes - no Care Gap present  If the 
healthy diet    6. Primary hypertension  Goal <130/80; well controlled with current treatment  - amLODIPine (NORVASC) 5 MG tablet; Take 1 tablet by mouth daily  Dispense: 90 tablet; Refill: 3       Lab review: labs reviewed, I note that lipids LDL result does not yet meet goal, HDL normal      I have discussed the diagnosis with the patient and the intended plan as seen in the above orders.  The patient has received an after-visit summary and questions were answered concerning future plans.  I have discussed medication side effects and warnings with the patient as well. I have reviewed the plan of care with the patient, accepted their input and they are in agreement with the treatment goals.

## 2024-10-09 DIAGNOSIS — R06.02 SHORTNESS OF BREATH: ICD-10-CM

## 2024-10-09 DIAGNOSIS — R60.0 BILATERAL LOWER EXTREMITY EDEMA: ICD-10-CM

## 2024-10-09 NOTE — TELEPHONE ENCOUNTER
Medication(s) requesting:   Requested Prescriptions     Pending Prescriptions Disp Refills    potassium chloride (KLOR-CON M) 10 MEQ extended release tablet [Pharmacy Med Name: POTASSIUM CHLORIDE ER M-10 MEQ TAB] 90 tablet 3     Sig: TAKE 1 TABLET BY MOUTH DAILY       Last office visit:  9.3.24  Next office visit DMA: Visit date not found

## 2024-10-11 RX ORDER — POTASSIUM CHLORIDE 750 MG/1
10 TABLET, EXTENDED RELEASE ORAL DAILY
Qty: 90 TABLET | Refills: 3 | Status: SHIPPED | OUTPATIENT
Start: 2024-10-11

## 2024-11-10 DIAGNOSIS — I10 PRIMARY HYPERTENSION: ICD-10-CM

## 2024-11-11 RX ORDER — AMLODIPINE BESYLATE 5 MG/1
10 TABLET ORAL DAILY
Qty: 180 TABLET | Refills: 3 | Status: SHIPPED | OUTPATIENT
Start: 2024-11-11

## 2024-11-25 RX ORDER — APIXABAN 5 MG/1
5 TABLET, FILM COATED ORAL 2 TIMES DAILY
Qty: 60 TABLET | Refills: 3 | Status: SHIPPED | OUTPATIENT
Start: 2024-11-25

## 2025-01-17 ENCOUNTER — TELEPHONE (OUTPATIENT)
Facility: CLINIC | Age: 80
End: 2025-01-17

## 2025-01-17 NOTE — TELEPHONE ENCOUNTER
Pt called requesting to speak with Dr. Lara about getting an another medication because the Eliquis is too expensive.    Please call. Thank you.

## 2025-02-21 ENCOUNTER — OFFICE VISIT (OUTPATIENT)
Facility: CLINIC | Age: 80
End: 2025-02-21
Payer: MEDICARE

## 2025-02-21 VITALS
WEIGHT: 245.4 LBS | HEART RATE: 99 BPM | DIASTOLIC BLOOD PRESSURE: 70 MMHG | RESPIRATION RATE: 20 BRPM | OXYGEN SATURATION: 99 % | SYSTOLIC BLOOD PRESSURE: 125 MMHG | BODY MASS INDEX: 33.24 KG/M2 | HEIGHT: 72 IN | TEMPERATURE: 98.6 F

## 2025-02-21 DIAGNOSIS — N18.31 CHRONIC KIDNEY DISEASE, STAGE 3A (HCC): ICD-10-CM

## 2025-02-21 DIAGNOSIS — Z86.711 HISTORY OF PULMONARY EMBOLUS (PE): ICD-10-CM

## 2025-02-21 DIAGNOSIS — J06.9 URI WITH COUGH AND CONGESTION: Primary | ICD-10-CM

## 2025-02-21 DIAGNOSIS — D47.Z2 CASTLEMAN DISEASE (HCC): ICD-10-CM

## 2025-02-21 PROCEDURE — 1123F ACP DISCUSS/DSCN MKR DOCD: CPT | Performed by: FAMILY MEDICINE

## 2025-02-21 PROCEDURE — 99214 OFFICE O/P EST MOD 30 MIN: CPT | Performed by: FAMILY MEDICINE

## 2025-02-21 RX ORDER — BENZONATATE 200 MG/1
200 CAPSULE ORAL 3 TIMES DAILY PRN
Qty: 30 CAPSULE | Refills: 0 | Status: SHIPPED | OUTPATIENT
Start: 2025-02-21 | End: 2025-03-03

## 2025-02-21 SDOH — ECONOMIC STABILITY: FOOD INSECURITY: WITHIN THE PAST 12 MONTHS, YOU WORRIED THAT YOUR FOOD WOULD RUN OUT BEFORE YOU GOT MONEY TO BUY MORE.: NEVER TRUE

## 2025-02-21 SDOH — ECONOMIC STABILITY: FOOD INSECURITY: WITHIN THE PAST 12 MONTHS, THE FOOD YOU BOUGHT JUST DIDN'T LAST AND YOU DIDN'T HAVE MONEY TO GET MORE.: NEVER TRUE

## 2025-02-21 ASSESSMENT — PATIENT HEALTH QUESTIONNAIRE - PHQ9
SUM OF ALL RESPONSES TO PHQ QUESTIONS 1-9: 0
1. LITTLE INTEREST OR PLEASURE IN DOING THINGS: NOT AT ALL
SUM OF ALL RESPONSES TO PHQ QUESTIONS 1-9: 0
SUM OF ALL RESPONSES TO PHQ9 QUESTIONS 1 & 2: 0
SUM OF ALL RESPONSES TO PHQ QUESTIONS 1-9: 0
2. FEELING DOWN, DEPRESSED OR HOPELESS: NOT AT ALL
SUM OF ALL RESPONSES TO PHQ QUESTIONS 1-9: 0

## 2025-02-21 NOTE — PROGRESS NOTES
Roger Wiggins is a 80 y.o. year old male who presents today for   Chief Complaint   Patient presents with    Cough    Chest Congestion    Nasal Congestion        \"Have you been to the ER, urgent care clinic since your last visit?  Hospitalized since your last visit?\"   NO     “Have you seen or consulted any other health care providers outside our system since your last visit?”   YES - When: approximately 1 months ago.  Where and Why: [facility] .             Beverly Levi  [facility]  Phone: [phone]  Fax: [phone]

## 2025-02-21 NOTE — PROGRESS NOTES
Roger Wiggins is a 80 y.o.  male and presents with    Chief Complaint   Patient presents with    Cough    Chest Congestion    Nasal Congestion       Subjective:  Mr. Wiggins is a 79 yo male here for productive cough with nasal congestion and pleuritic chest pain for the past 4 days. Patient describes his mucus as green-yellow. He takes Robitussin-DM QD, which has been alleviating his symptoms, but patient would like some additional medications and is concerned about contraindications with his Hx of HTN. Patient has been compliant with all his medications, but he has not been taking Eliquis (was on Eliquis for 5 months) due to its prohibitive cost and takes daily baby Aspirin instead.             Additional Concerns: Prohibitive cost of Eliquis.    ROS   Constitutional:  Negative for chills and fever.   HENT:  Positive for nasal congestion. Negative for ear pain, hearing loss, sinus pressure, sinus pain, sore throat and trouble swallowing.    Eyes:  Negative for pain and visual disturbance.   Pulm: Positive for productive cough, pleuritic chest pain  Gastrointestinal:  Negative for blood in stool, constipation, diarrhea, nausea and vomiting.   Endocrine: Negative for polydipsia.   Psychiatric/Behavioral:  Negative for self-injury, sleep disturbance and suicidal ideas.  her systems reviewed and are negative.       All other systems reviewed and are negative.      Objective:  Vitals:    02/21/25 1208   BP: 125/70   Pulse: 99   Resp: 20   Temp: 98.6 °F (37 °C)   SpO2: 99%         alert, well appearing, and in no distress, oriented to person, place, and time, and obese  HEENT: productive cough, nasal congestion  Mental status - normal mood, behavior, speech, dress, motor activity, and thought processes  Chest - clear to auscultation, no wheezes, rales or rhonchi, symmetric air entry  Heart - normal rate, regular rhythm, normal S1, S2, no murmurs, rubs, clicks or gallops  Neurological - cranial nerves II

## 2025-03-26 ENCOUNTER — OFFICE VISIT (OUTPATIENT)
Facility: CLINIC | Age: 80
End: 2025-03-26
Payer: MEDICARE

## 2025-03-26 VITALS
DIASTOLIC BLOOD PRESSURE: 64 MMHG | WEIGHT: 240.6 LBS | HEIGHT: 72 IN | RESPIRATION RATE: 20 BRPM | SYSTOLIC BLOOD PRESSURE: 101 MMHG | OXYGEN SATURATION: 100 % | BODY MASS INDEX: 32.59 KG/M2 | TEMPERATURE: 97.3 F | HEART RATE: 87 BPM

## 2025-03-26 DIAGNOSIS — R60.0 BILATERAL LOWER EXTREMITY EDEMA: ICD-10-CM

## 2025-03-26 DIAGNOSIS — E78.00 HYPERCHOLESTEROLEMIA: ICD-10-CM

## 2025-03-26 DIAGNOSIS — C61 PROSTATE CANCER (HCC): ICD-10-CM

## 2025-03-26 DIAGNOSIS — I10 PRIMARY HYPERTENSION: ICD-10-CM

## 2025-03-26 DIAGNOSIS — Z00.00 MEDICARE ANNUAL WELLNESS VISIT, SUBSEQUENT: Primary | ICD-10-CM

## 2025-03-26 DIAGNOSIS — Z71.89 ADVANCE CARE PLANNING: ICD-10-CM

## 2025-03-26 DIAGNOSIS — N18.31 CHRONIC KIDNEY DISEASE, STAGE 3A (HCC): ICD-10-CM

## 2025-03-26 PROCEDURE — G0439 PPPS, SUBSEQ VISIT: HCPCS | Performed by: FAMILY MEDICINE

## 2025-03-26 PROCEDURE — 99497 ADVNCD CARE PLAN 30 MIN: CPT | Performed by: FAMILY MEDICINE

## 2025-03-26 PROCEDURE — 99214 OFFICE O/P EST MOD 30 MIN: CPT | Performed by: FAMILY MEDICINE

## 2025-03-26 SDOH — ECONOMIC STABILITY: FOOD INSECURITY: WITHIN THE PAST 12 MONTHS, YOU WORRIED THAT YOUR FOOD WOULD RUN OUT BEFORE YOU GOT MONEY TO BUY MORE.: NEVER TRUE

## 2025-03-26 SDOH — ECONOMIC STABILITY: FOOD INSECURITY: WITHIN THE PAST 12 MONTHS, THE FOOD YOU BOUGHT JUST DIDN'T LAST AND YOU DIDN'T HAVE MONEY TO GET MORE.: NEVER TRUE

## 2025-03-26 NOTE — PATIENT INSTRUCTIONS
to your heart muscle. This can narrow the blood vessels and reduce blood flow. A heart attack happens when blood flow is completely blocked. A high-fat diet, smoking, and other factors increase the risk of heart disease.  Your doctor has found that you have a chance of having heart disease. A heart-healthy lifestyle can help keep your heart healthy and prevent heart disease. This lifestyle includes eating healthy, being active, staying at a weight that's healthy for you, and not smoking or using tobacco. It also includes taking medicines as directed, managing other health conditions, and trying to get a healthy amount of sleep.  Follow-up care is a key part of your treatment and safety. Be sure to make and go to all appointments, and call your doctor if you are having problems. It's also a good idea to know your test results and keep a list of the medicines you take.  How can you care for yourself at home?  Diet    Use less salt when you cook and eat. This helps lower your blood pressure. Taste food before salting. Add only a little salt when you think you need it. With time, your taste buds will adjust to less salt.     Eat fewer snack items, fast foods, canned soups, and other high-salt, high-fat, processed foods.     Read food labels and try to avoid saturated and trans fats. They increase your risk of heart disease by raising cholesterol levels.     Limit the amount of solid fat--butter, margarine, and shortening--you eat. Use olive, peanut, or canola oil when you cook. Bake, broil, and steam foods instead of frying them.     Eat a variety of fruit and vegetables every day. Dark green, deep orange, red, or yellow fruits and vegetables are especially good for you. Examples include spinach, carrots, peaches, and berries.     Foods high in fiber can reduce your cholesterol and provide important vitamins and minerals. High-fiber foods include whole-grain cereals and breads, oatmeal, beans, brown rice, citrus fruits,

## 2025-03-26 NOTE — PROGRESS NOTES
Medicare Annual Wellness Visit    Roger Wiggins is here for Medicare AWV    Assessment & Plan   Medicare annual wellness visit, subsequent  Chronic kidney disease, stage 3a (HCC)  Primary hypertension  -     Comprehensive Metabolic Panel; Future  Hypercholesterolemia  -     Lipid Panel; Future  -     Comprehensive Metabolic Panel; Future  Prostate cancer (HCC)  Bilateral lower extremity edema  Advance care planning     Return in about 1 year (around 3/26/2026) for medication evaluation, result review.     Subjective   The following acute and/or chronic problems were also addressed today:  Hypertension  Cholesterol problem  Edema  obesity    Patient's complete Health Risk Assessment and screening values have been reviewed and are found in Flowsheets. The following problems were reviewed today and where indicated follow up appointments were made and/or referrals ordered.    Positive Risk Factor Screenings with Interventions:                Abnormal BMI (obese):  Body mass index is 32.63 kg/m². (!) Abnormal  Interventions:  low carbohydrate diet          Vision Screen:  Visual Acuity screen is abnormal due to a score of 20/25 or worse.  Interventions:   Patient encouraged to make appointment with their eye specialist          Advance Care Planning   The patient has the following advanced directives on file:  Advance Directives       Power of  Living Will ACP-Advance Directive ACP-Power of     Not on File Filed on 07/14/16 Filed Not on File            The patient has appointed the following active healthcare agents:    Primary Decision Maker: Itzel Wiggins - Spouse - [phone]    Secondary Decision Maker: Wendy Wiggins - Child - [phone]    Supplemental (Other) Decision Maker: Jackson Wiggins - Child - [phone]    The Patient has the following current code status:    Code Status: Not on file    Visit Documentation:  I discussed Advance Care Planning with Roger Wiggins today which included the

## 2025-03-26 NOTE — PROGRESS NOTES
Roger Wiggins is a 80 y.o. year old male who presents today for   Chief Complaint   Patient presents with    Medicare AWV        \"Have you been to the ER, urgent care clinic since your last visit?  Hospitalized since your last visit?\"   NO     “Have you seen or consulted any other health care providers outside our system since your last visit?”   NO             [facility]  Phone: [phone]  Fax: [phone]

## 2025-03-27 LAB
ALBUMIN SERPL-MCNC: 4.3 G/DL (ref 3.8–4.8)
ALP SERPL-CCNC: 81 IU/L (ref 44–121)
ALT SERPL-CCNC: 13 IU/L (ref 0–44)
AST SERPL-CCNC: 14 IU/L (ref 0–40)
BILIRUB SERPL-MCNC: 0.4 MG/DL (ref 0–1.2)
BUN SERPL-MCNC: 19 MG/DL (ref 8–27)
BUN/CREAT SERPL: 15 (ref 10–24)
CALCIUM SERPL-MCNC: 9.2 MG/DL (ref 8.6–10.2)
CHLORIDE SERPL-SCNC: 105 MMOL/L (ref 96–106)
CHOLEST SERPL-MCNC: 190 MG/DL (ref 100–199)
CO2 SERPL-SCNC: 23 MMOL/L (ref 20–29)
CREAT SERPL-MCNC: 1.3 MG/DL (ref 0.76–1.27)
EGFRCR SERPLBLD CKD-EPI 2021: 56 ML/MIN/1.73
GLOBULIN SER CALC-MCNC: 4 G/DL (ref 1.5–4.5)
GLUCOSE SERPL-MCNC: 98 MG/DL (ref 70–99)
HDLC SERPL-MCNC: 49 MG/DL
LDLC SERPL CALC-MCNC: 127 MG/DL (ref 0–99)
POTASSIUM SERPL-SCNC: 4.4 MMOL/L (ref 3.5–5.2)
PROT SERPL-MCNC: 8.3 G/DL (ref 6–8.5)
SODIUM SERPL-SCNC: 141 MMOL/L (ref 134–144)
TRIGL SERPL-MCNC: 77 MG/DL (ref 0–149)
VLDLC SERPL CALC-MCNC: 14 MG/DL (ref 5–40)

## 2025-04-04 ENCOUNTER — RESULTS FOLLOW-UP (OUTPATIENT)
Facility: CLINIC | Age: 80
End: 2025-04-04

## 2025-07-02 DIAGNOSIS — R60.0 BILATERAL LOWER EXTREMITY EDEMA: ICD-10-CM

## 2025-07-02 DIAGNOSIS — R06.02 SHORTNESS OF BREATH: ICD-10-CM

## 2025-07-02 NOTE — TELEPHONE ENCOUNTER
Medication requested :   Requested Prescriptions     Pending Prescriptions Disp Refills    furosemide (LASIX) 20 MG tablet [Pharmacy Med Name: FUROSEMIDE 20 MG TABLET] 180 tablet      Sig: TAKE 2 TABLETS BY MOUTH DAILY      PCP: Salo Lara MD  LOV:           3/26/2025   NOV DMA: Visit date not found  FUTURE APPT:   Future Appointments   Date Time Provider Department Center   7/8/2025 11:10 AM [facility] [place] TECH NURSE [facility]   7/15/2025 10:20 AM Yadi Gray APRN - NP [facility]       Thank you.

## 2025-07-04 RX ORDER — FUROSEMIDE 20 MG/1
40 TABLET ORAL DAILY
Qty: 180 TABLET | Refills: 3 | Status: SHIPPED | OUTPATIENT
Start: 2025-07-04

## 2025-07-07 ENCOUNTER — OFFICE VISIT (OUTPATIENT)
Facility: CLINIC | Age: 80
End: 2025-07-07
Payer: MEDICARE

## 2025-07-07 VITALS
TEMPERATURE: 97.5 F | HEART RATE: 87 BPM | HEIGHT: 72 IN | DIASTOLIC BLOOD PRESSURE: 71 MMHG | BODY MASS INDEX: 31.89 KG/M2 | OXYGEN SATURATION: 94 % | SYSTOLIC BLOOD PRESSURE: 127 MMHG | RESPIRATION RATE: 18 BRPM | WEIGHT: 235.4 LBS

## 2025-07-07 DIAGNOSIS — J06.9 VIRAL UPPER RESPIRATORY TRACT INFECTION: Primary | ICD-10-CM

## 2025-07-07 PROCEDURE — G2211 COMPLEX E/M VISIT ADD ON: HCPCS | Performed by: STUDENT IN AN ORGANIZED HEALTH CARE EDUCATION/TRAINING PROGRAM

## 2025-07-07 PROCEDURE — 99215 OFFICE O/P EST HI 40 MIN: CPT | Performed by: STUDENT IN AN ORGANIZED HEALTH CARE EDUCATION/TRAINING PROGRAM

## 2025-07-07 PROCEDURE — 1123F ACP DISCUSS/DSCN MKR DOCD: CPT | Performed by: STUDENT IN AN ORGANIZED HEALTH CARE EDUCATION/TRAINING PROGRAM

## 2025-07-07 RX ORDER — TAMSULOSIN HYDROCHLORIDE 0.4 MG/1
0.4 CAPSULE ORAL DAILY
Qty: 90 CAPSULE | Refills: 0 | Status: CANCELLED | OUTPATIENT
Start: 2025-07-07

## 2025-07-07 RX ORDER — CODEINE PHOSPHATE AND GUAIFENESIN 10; 100 MG/5ML; MG/5ML
5 SOLUTION ORAL 3 TIMES DAILY PRN
Qty: 105 ML | Refills: 0 | Status: SHIPPED | OUTPATIENT
Start: 2025-07-07 | End: 2025-07-14

## 2025-07-07 RX ORDER — ACETAMINOPHEN 500 MG
500 TABLET ORAL EVERY 6 HOURS PRN
Qty: 120 TABLET | Refills: 0 | Status: SHIPPED | OUTPATIENT
Start: 2025-07-07

## 2025-07-07 RX ORDER — PSEUDOEPHEDRINE HYDROCHLORIDE 60 MG/1
60 TABLET, FILM COATED ORAL EVERY 6 HOURS PRN
Qty: 28 TABLET | Refills: 0 | Status: SHIPPED | OUTPATIENT
Start: 2025-07-07

## 2025-07-07 ASSESSMENT — ENCOUNTER SYMPTOMS: COUGH: 1

## 2025-07-07 NOTE — PROGRESS NOTES
Roger Wiggins (:  1945) is a 80 y.o. male here for evaluation of the following chief complaint(s):  Cough, Congestion, Nasal Congestion, and Medication Refill          ASSESSMENT/PLAN:  1. Viral upper respiratory tract infection  -     pseudoephedrine (SUDAFED) 60 MG tablet; Take 1 tablet by mouth every 6 hours as needed for Congestion, Disp-28 tablet, R-0Normal  -     dextromethorphan-guaiFENesin  MG/5ML LIQD syrup; Take 5 mLs by mouth every 4 hours as needed (cough or chest congestion), Disp-237 mL, R-1Normal  -     acetaminophen (TYLENOL) 500 MG tablet; Take 1 tablet by mouth every 6 hours as needed for Pain or Fever, Disp-120 tablet, R-0Normal  -     guaiFENesin-codeine (GUAIFENESIN AC) 100-10 MG/5ML liquid; Take 5 mLs by mouth 3 times daily as needed for Cough for up to 7 days. Max Daily Amount: 15 mLs, Disp-105 mL, R-0Normal  - likely viral URI, possible silent reflux; UTD on vaccines  - central airway sounds only  - conservative tx; consider CXR if cough persists for 3 weeks    Follow-up and Dispositions    Return if symptoms worsen or fail to improve.         SUBJECTIVE/OBJECTIVE:  Cough    Medication Refill  Associated symptoms include coughing.     URI sxs  - minor rhinorrhea, nasal congestion, productive cough, no fever, no sinus pressure, wheeze x 5 days  - cough worse in the morning  - no hx asthma/COPD  - positive sick contacts  - vaccines: UTD flu, UTD COVID, UTD PCV, UTD RSV  - tried: delsym with moderate benefit     ROS as stated above.    /71 (BP Site: Left Upper Arm, Patient Position: Sitting, BP Cuff Size: Large Adult)   Pulse 87   Temp 97.5 °F (36.4 °C) (Temporal)   Resp 18   Ht 1.829 m (6')   Wt 106.8 kg (235 lb 6.4 oz)   SpO2 94%   BMI 31.93 kg/m²     Physical Exam  Constitutional:       Appearance: Normal appearance.   Pulmonary:      Effort: Pulmonary effort is normal.   Neurological:      Mental Status: He is alert and oriented to person, place, and time.

## 2025-07-07 NOTE — PROGRESS NOTES
Roger Wiggins is a 80 y.o. year old male who presents today for No chief complaint on file.       \"Have you been to the ER, urgent care clinic since your last visit?  Hospitalized since your last visit?\"   YES - When: approximately 2 months ago.  Where and Why: [facility] ED/ [facility].     “Have you seen or consulted any other health care providers outside our system since your last visit?”   NO             Beverly Lui [facility]  Phone: [phone]  Fax: [phone]

## 2025-07-16 ENCOUNTER — TELEPHONE (OUTPATIENT)
Facility: CLINIC | Age: 80
End: 2025-07-16

## 2025-07-16 NOTE — TELEPHONE ENCOUNTER
Pt is calling in states the medication prescribed is not working and is getting worst       pseudoephedrine (SUDAFED) 60 MG tablet     Can he get a different prescription       Please advise    Thank you

## 2025-08-28 ENCOUNTER — OFFICE VISIT (OUTPATIENT)
Facility: CLINIC | Age: 80
End: 2025-08-28
Payer: MEDICARE

## 2025-08-28 VITALS
DIASTOLIC BLOOD PRESSURE: 81 MMHG | TEMPERATURE: 97.8 F | HEART RATE: 87 BPM | OXYGEN SATURATION: 94 % | RESPIRATION RATE: 17 BRPM | SYSTOLIC BLOOD PRESSURE: 119 MMHG | BODY MASS INDEX: 32.37 KG/M2 | HEIGHT: 72 IN | WEIGHT: 239 LBS

## 2025-08-28 DIAGNOSIS — C61 PROSTATE CANCER (HCC): ICD-10-CM

## 2025-08-28 DIAGNOSIS — I10 PRIMARY HYPERTENSION: ICD-10-CM

## 2025-08-28 DIAGNOSIS — Z00.00 ANNUAL PHYSICAL EXAM: Primary | ICD-10-CM

## 2025-08-28 DIAGNOSIS — E78.00 HYPERCHOLESTEROLEMIA: ICD-10-CM

## 2025-08-28 PROCEDURE — 3079F DIAST BP 80-89 MM HG: CPT | Performed by: FAMILY MEDICINE

## 2025-08-28 PROCEDURE — 99397 PER PM REEVAL EST PAT 65+ YR: CPT | Performed by: FAMILY MEDICINE

## 2025-08-28 PROCEDURE — 3074F SYST BP LT 130 MM HG: CPT | Performed by: FAMILY MEDICINE

## 2025-08-28 RX ORDER — ATORVASTATIN CALCIUM 40 MG/1
40 TABLET, FILM COATED ORAL DAILY
Qty: 90 TABLET | Refills: 0 | Status: SHIPPED | OUTPATIENT
Start: 2025-08-28

## 2025-08-28 SDOH — ECONOMIC STABILITY: FOOD INSECURITY: WITHIN THE PAST 12 MONTHS, THE FOOD YOU BOUGHT JUST DIDN'T LAST AND YOU DIDN'T HAVE MONEY TO GET MORE.: NEVER TRUE

## 2025-08-28 SDOH — ECONOMIC STABILITY: FOOD INSECURITY: WITHIN THE PAST 12 MONTHS, YOU WORRIED THAT YOUR FOOD WOULD RUN OUT BEFORE YOU GOT MONEY TO BUY MORE.: NEVER TRUE

## 2025-08-28 ASSESSMENT — PATIENT HEALTH QUESTIONNAIRE - PHQ9
SUM OF ALL RESPONSES TO PHQ QUESTIONS 1-9: 0
SUM OF ALL RESPONSES TO PHQ QUESTIONS 1-9: 0
2. FEELING DOWN, DEPRESSED OR HOPELESS: NOT AT ALL
1. LITTLE INTEREST OR PLEASURE IN DOING THINGS: NOT AT ALL
SUM OF ALL RESPONSES TO PHQ QUESTIONS 1-9: 0
SUM OF ALL RESPONSES TO PHQ QUESTIONS 1-9: 0